# Patient Record
Sex: FEMALE | Race: WHITE | NOT HISPANIC OR LATINO | Employment: OTHER | URBAN - METROPOLITAN AREA
[De-identification: names, ages, dates, MRNs, and addresses within clinical notes are randomized per-mention and may not be internally consistent; named-entity substitution may affect disease eponyms.]

---

## 2018-06-21 PROBLEM — M65.342 TRIGGER FINGER, LEFT RING FINGER: Status: ACTIVE | Noted: 2018-06-21

## 2018-06-21 PROBLEM — Z89.022: Status: ACTIVE | Noted: 2018-06-21

## 2019-06-28 ENCOUNTER — OFFICE VISIT (OUTPATIENT)
Dept: NEUROSURGERY | Facility: CLINIC | Age: 74
End: 2019-06-28
Payer: MEDICARE

## 2019-06-28 VITALS — HEART RATE: 63 BPM | DIASTOLIC BLOOD PRESSURE: 68 MMHG | SYSTOLIC BLOOD PRESSURE: 121 MMHG

## 2019-06-28 DIAGNOSIS — G89.29 CHRONIC MIDLINE LOW BACK PAIN WITHOUT SCIATICA: Primary | ICD-10-CM

## 2019-06-28 DIAGNOSIS — M54.50 CHRONIC MIDLINE LOW BACK PAIN WITHOUT SCIATICA: Primary | ICD-10-CM

## 2019-06-28 DIAGNOSIS — M25.559 ARTHRALGIA OF HIP, UNSPECIFIED LATERALITY: ICD-10-CM

## 2019-06-28 PROCEDURE — 99204 PR OFFICE/OUTPT VISIT, NEW, LEVL IV, 45-59 MIN: ICD-10-PCS | Mod: S$PBB,,, | Performed by: PHYSICIAN ASSISTANT

## 2019-06-28 PROCEDURE — 99204 OFFICE O/P NEW MOD 45 MIN: CPT | Mod: S$PBB,,, | Performed by: PHYSICIAN ASSISTANT

## 2019-06-28 PROCEDURE — 99999 PR PBB SHADOW E&M-EST. PATIENT-LVL IV: CPT | Mod: PBBFAC,,, | Performed by: PHYSICIAN ASSISTANT

## 2019-06-28 PROCEDURE — 99214 OFFICE O/P EST MOD 30 MIN: CPT | Mod: PBBFAC,PN | Performed by: PHYSICIAN ASSISTANT

## 2019-06-28 PROCEDURE — 99999 PR PBB SHADOW E&M-EST. PATIENT-LVL IV: ICD-10-PCS | Mod: PBBFAC,,, | Performed by: PHYSICIAN ASSISTANT

## 2019-06-28 RX ORDER — CHOLECALCIFEROL (VITAMIN D3) 25 MCG
1000 TABLET ORAL DAILY
COMMUNITY

## 2019-06-28 RX ORDER — MULTIVIT WITH MINERALS/HERBS
1 TABLET ORAL DAILY
COMMUNITY
End: 2021-03-05 | Stop reason: CLARIF

## 2019-06-28 NOTE — PROGRESS NOTES
[facility] Neurosurgery  Clinic Consult     Consult Requested By: Prince Bains  PCP: Aashish Santos MD    Chief Complaint:   Chief Complaint   Patient presents with    Lumbar Spine Pain (L-Spine)     patient reports low back pain that radiates into the bilateral hips; denies numbness and tingling; previous low back surgery done abnout 8 years ago; pain 5/10         Past Medical History:   Diagnosis Date    Anticoagulant long-term use     CAD (coronary artery disease)     Encounter for blood transfusion     Hypertension     Thyroid disease      Past Surgical History:   Procedure Laterality Date    CARDIAC SURGERY  2007    x 4 vessels    CAROTID ENDARTERECTOMY Bilateral     each side done twice    CORONARY ANGIOPLASTY WITH STENT PLACEMENT      CORONARY ARTERY BYPASS GRAFT      HYSTERECTOMY      RELEASE-FINGER-TRIGGER-LEFT RING Left 2018    Performed by Norbert Ruelas MD at [facility] OR    REVISION AMPUTATION OF FINGER Left     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      REVISION OF NAILBED LEFT SMALL FINGER Left 2018    Performed by Norbert Ruelas MD at [facility] OR     Family History   Problem Relation Age of Onset    Hypertension Mother     Arthritis Mother     Cancer Father         esophageal    No Known Problems Sister     Heart disease Brother     Cancer Brother     Heart disease Brother     Cancer Brother      Social History     Tobacco Use    Smoking status: Former Smoker     Last attempt to quit: 2007     Years since quittin.9    Smokeless tobacco: Never Used   Substance Use Topics    Alcohol use: Yes     Comment: socially    Drug use: No      Review of patient's allergies indicates:  No Known Allergies    Current Outpatient Medications:     aspirin (ECOTRIN) 81 MG EC tablet, Take 81 mg by mouth every evening. , Disp: , Rfl:     b complex vitamins tablet, Take 1 tablet by mouth once daily., Disp: , Rfl:     carvedilol (COREG) 6.25 MG tablet, Take 6.25 mg  by mouth 2 (two) times daily with meals., Disp: , Rfl:     clopidogrel (PLAVIX) 75 mg tablet, Take 75 mg by mouth once daily., Disp: , Rfl:     coQ10, ubiquinol, 200 mg Cap, Take 400 mg by mouth once daily. , Disp: , Rfl:     escitalopram oxalate (LEXAPRO) 10 MG tablet, Take 10 mg by mouth once daily., Disp: , Rfl:     glucosamine-chondroitin 500-400 mg tablet, Take 1 tablet by mouth 2 (two) times daily., Disp: , Rfl:     hydroCHLOROthiazide (HYDRODIURIL) 12.5 MG Tab, Take 12.5 mg by mouth once daily., Disp: , Rfl:     levothyroxine (SYNTHROID) 50 MCG tablet, Take 50 mcg by mouth once daily., Disp: , Rfl:     loratadine (CLARITIN) 10 mg tablet, Take 10 mg by mouth once daily., Disp: , Rfl:     meloxicam (MOBIC) 15 MG tablet, TAKE 1 TABLET EVERY DAY, Disp: 90 tablet, Rfl: 0    mirabegron (MYRBETRIQ) 25 mg Tb24 ER tablet, Take 25 mg by mouth once daily., Disp: , Rfl:     multivitamin (ONE DAILY MULTIVITAMIN) per tablet, Take 1 tablet by mouth once daily., Disp: , Rfl:     rosuvastatin (CRESTOR) 40 MG Tab, Take 10 mg by mouth every evening. , Disp: , Rfl:     vitamin D (VITAMIN D3) 1000 units Tab, Take 1,000 Units by mouth once daily., Disp: , Rfl:     biotin 10,000 mcg Cap, Take 1 capsule by mouth every evening., Disp: , Rfl:     HYDROcodone-acetaminophen (NORCO)  mg per tablet, Take 1 tablet by mouth every 4 (four) hours as needed (pain)., Disp: 25 tablet, Rfl: 0    Review of Systems:   Constitutional: no fever, chills or night sweats. No changes in weight   Eyes: no visual changes   ENT: no nasal congestion or sore throat   Respiratory: no cough or shortness of breath   Cardiovascular: no chest pain or palpitations   Gastrointestinal: no nausea or vomiting   Genitourinary: no hematuria or dysuria   Integument/Breast: no rash or pruritis   Hematologic/Lymphatic: no easy bruising or lymphadenopathy   Musculoskeletal: +back pain, arthralgias   Neurological: no seizures or tremors   Behavioral/Psych:  no auditory or visual hallucinations   Endocrine: no heat or cold intolerance         OBJECTIVE:     Vital Signs (Most Recent):  Pulse: 63 (06/28/19 1046)  BP: 121/68 (06/28/19 1046)    Physical Exam:   General: well developed, well nourished, no distress.   Neurologic: Alert and oriented. Thought content appropriate. GCS 15.   Head: normocephalic, atraumatic  Eyes: EOMI.  Neck: trachea midline, no JVD   Cardiovascular: no LE edema  Pulmonary: normal respirations, no signs of respiratory distress  Abdomen: non-distended  Sensory: intact to light touch throughout  Skin: Skin is warm, dry and intact.    Motor Strength: Moves all extremities spontaneously with good tone. No abnormal movements seen.       Iliopsoas Quadriceps Knee  Flexion Tibialis  anterior Gastro- cnemius EHL   Lower: R 5/5 5/5 5/5 5/5 5/5 5/5    L 5/5 5/5 5/5 5/5 5/5 5/5     DTR's: 2+ in LE  Clonus: absent    Gait: normal             Able to walk on heels & toes    Lumbar Spine: full ROM, no TTP  Pain on Hip ROM: Negative  Straight leg raise: negative bilaterally       Provider Dictation:     Kenzie Everett is a 73 y.o. female who presents for evaluation of chronic low back and hip pain. Patient reports intermittent low back and hip pain for several years. She states she has been evaluated by ortho and received hip injections in the past with significant pain relief. She denies leg pain, numbness, tingling, weakness. Denies bowel/bladder dysfunction. The hip pain is present with ambulation. She states the pain is mild today. She has not attended physical therapy. She has no imaging of her low back.     On exam, patient has fluid gait, full strength in lower extremities.     PHQ 0  Oswestry Disability Index 30    Given the above, patient has chronic, intermittent low back and hip pain. Hip pain responded well to injections in the past. Patient has not completed any conservative management for her chronic, intermittent low back pain. I  recommend physical therapy for low back and hip. She will reach out to ortho to try another hip injection. I will follow up by phone in 6 weeks. Will order lumbar MRI if fails to improve with PT.     Patient verbalized understanding of plan. Encouraged to call with any questions or concerns.     Chronic midline low back pain without sciatica  -     Ambulatory Referral to Physical/Occupational Therapy    Arthralgia of hip, unspecified laterality  -     Ambulatory Referral to Physical/Occupational Therapy  -     Ambulatory Referral to Physical/Occupational Therapy

## 2019-10-03 ENCOUNTER — TELEPHONE (OUTPATIENT)
Dept: NEUROSURGERY | Facility: CLINIC | Age: 74
End: 2019-10-03

## 2019-10-03 NOTE — TELEPHONE ENCOUNTER
----- Message from Saundra Almodovar sent at 10/3/2019 10:12 AM CDT -----  Contact: Liliana mcleod/ [facility]  [phone]  Liliana is calling to speak with you concerning the patient plan of care that has to be signed.  Please call back to assist at [phone]

## 2019-11-21 ENCOUNTER — OFFICE VISIT (OUTPATIENT)
Dept: SPINE | Facility: CLINIC | Age: 74
End: 2019-11-21
Payer: MEDICARE

## 2019-11-21 VITALS
BODY MASS INDEX: 29.19 KG/M2 | HEIGHT: 62 IN | DIASTOLIC BLOOD PRESSURE: 67 MMHG | HEART RATE: 62 BPM | SYSTOLIC BLOOD PRESSURE: 139 MMHG | WEIGHT: 158.63 LBS

## 2019-11-21 DIAGNOSIS — M54.42 CHRONIC BILATERAL LOW BACK PAIN WITH LEFT-SIDED SCIATICA: Primary | ICD-10-CM

## 2019-11-21 DIAGNOSIS — Z98.890 HISTORY OF LUMBAR SURGERY: ICD-10-CM

## 2019-11-21 DIAGNOSIS — M25.552 PAIN OF BOTH HIP JOINTS: ICD-10-CM

## 2019-11-21 DIAGNOSIS — M25.551 PAIN OF BOTH HIP JOINTS: ICD-10-CM

## 2019-11-21 DIAGNOSIS — G89.29 CHRONIC BILATERAL LOW BACK PAIN WITH LEFT-SIDED SCIATICA: Primary | ICD-10-CM

## 2019-11-21 PROCEDURE — 1159F PR MEDICATION LIST DOCUMENTED IN MEDICAL RECORD: ICD-10-PCS | Mod: ,,, | Performed by: PHYSICIAN ASSISTANT

## 2019-11-21 PROCEDURE — 99999 PR PBB SHADOW E&M-EST. PATIENT-LVL V: ICD-10-PCS | Mod: PBBFAC,,, | Performed by: PHYSICIAN ASSISTANT

## 2019-11-21 PROCEDURE — 1125F PR PAIN SEVERITY QUANTIFIED, PAIN PRESENT: ICD-10-PCS | Mod: ,,, | Performed by: PHYSICIAN ASSISTANT

## 2019-11-21 PROCEDURE — 1159F MED LIST DOCD IN RCRD: CPT | Mod: ,,, | Performed by: PHYSICIAN ASSISTANT

## 2019-11-21 PROCEDURE — 99999 PR PBB SHADOW E&M-EST. PATIENT-LVL V: CPT | Mod: PBBFAC,,, | Performed by: PHYSICIAN ASSISTANT

## 2019-11-21 PROCEDURE — 99215 OFFICE O/P EST HI 40 MIN: CPT | Mod: PBBFAC,PN | Performed by: PHYSICIAN ASSISTANT

## 2019-11-21 PROCEDURE — 99213 PR OFFICE/OUTPT VISIT, EST, LEVL III, 20-29 MIN: ICD-10-PCS | Mod: S$PBB,,, | Performed by: PHYSICIAN ASSISTANT

## 2019-11-21 PROCEDURE — 99213 OFFICE O/P EST LOW 20 MIN: CPT | Mod: S$PBB,,, | Performed by: PHYSICIAN ASSISTANT

## 2019-11-21 PROCEDURE — 1125F AMNT PAIN NOTED PAIN PRSNT: CPT | Mod: ,,, | Performed by: PHYSICIAN ASSISTANT

## 2019-11-21 RX ORDER — POTASSIUM CHLORIDE 750 MG/1
10 CAPSULE, EXTENDED RELEASE ORAL
COMMUNITY
Start: 2019-10-09 | End: 2021-01-25 | Stop reason: CLARIF

## 2019-11-21 NOTE — PROGRESS NOTES
Back and Spine New Patient    Patient ID: Kenzie Everett is a 74 y.o. female.    Chief Complaint   Patient presents with    Low-back Pain     She has had low back pain for years. In the last month the pain is radiating down both hips and into the left leg. Pain is constant and nothing seems to help pain. Lifting, walking, and standing to long makes pain worse.       Review of Systems   Constitutional: Negative for activity change, appetite change, chills, fever and unexpected weight change.   HENT: Negative for tinnitus, trouble swallowing and voice change.    Respiratory: Negative for apnea, cough, chest tightness and shortness of breath.    Cardiovascular: Negative for chest pain and palpitations.   Gastrointestinal: Negative for constipation, diarrhea, nausea and vomiting.   Genitourinary: Negative for difficulty urinating, dysuria, frequency and urgency.   Musculoskeletal: Positive for arthralgias, back pain and myalgias. Negative for gait problem, neck pain and neck stiffness.   Skin: Negative for wound.   Neurological: Negative for dizziness, tremors, seizures, facial asymmetry, speech difficulty, weakness, light-headedness, numbness and headaches.   Psychiatric/Behavioral: Negative for confusion and decreased concentration.       Past Medical History:   Diagnosis Date    Anticoagulant long-term use     CAD (coronary artery disease)     Encounter for blood transfusion     Hypertension     Thyroid disease      Social History     Socioeconomic History    Marital status:      Spouse name: Not on file    Number of children: Not on file    Years of education: Not on file    Highest education level: Not on file   Occupational History    Not on file   Social Needs    Financial resource strain: Not on file    Food insecurity:     Worry: Not on file     Inability: Not on file    Transportation needs:     Medical: Not on file     Non-medical: Not on file   Tobacco Use    Smoking  status: Former Smoker     Last attempt to quit: 2007     Years since quittin.3    Smokeless tobacco: Never Used   Substance and Sexual Activity    Alcohol use: Yes     Comment: socially    Drug use: No    Sexual activity: Not on file   Lifestyle    Physical activity:     Days per week: Not on file     Minutes per session: Not on file    Stress: Not on file   Relationships    Social connections:     Talks on phone: Not on file     Gets together: Not on file     Attends [employer] service: Not on file     Active member of club or organization: Not on file     Attends meetings of clubs or organizations: Not on file     Relationship status: Not on file   Other Topics Concern    Not on file   Social History Narrative    Not on file     Family History   Problem Relation Age of Onset    Hypertension Mother     Arthritis Mother     Cancer Father         esophageal    No Known Problems Sister     Heart disease Brother     Cancer Brother     Heart disease Brother     Cancer Brother      Review of patient's allergies indicates:  No Known Allergies    Current Outpatient Medications:     aspirin (ECOTRIN) 81 MG EC tablet, Take 81 mg by mouth every evening. , Disp: , Rfl:     b complex vitamins tablet, Take 1 tablet by mouth once daily., Disp: , Rfl:     carvedilol (COREG) 6.25 MG tablet, Take 6.25 mg by mouth 2 (two) times daily with meals., Disp: , Rfl:     clopidogrel (PLAVIX) 75 mg tablet, Take 75 mg by mouth once daily., Disp: , Rfl:     coQ10, ubiquinol, 200 mg Cap, Take 400 mg by mouth once daily. , Disp: , Rfl:     escitalopram oxalate (LEXAPRO) 10 MG tablet, Take 10 mg by mouth once daily., Disp: , Rfl:     glucosamine-chondroitin 500-400 mg tablet, Take 1 tablet by mouth 2 (two) times daily., Disp: , Rfl:     hydroCHLOROthiazide (HYDRODIURIL) 12.5 MG Tab, Take 12.5 mg by mouth once daily., Disp: , Rfl:     levothyroxine (SYNTHROID) 50 MCG tablet, Take 50 mcg by mouth once daily., Disp:  ", Rfl:     loratadine (CLARITIN) 10 mg tablet, Take 10 mg by mouth once daily., Disp: , Rfl:     meloxicam (MOBIC) 15 MG tablet, TAKE 1 TABLET EVERY DAY, Disp: 90 tablet, Rfl: 0    mirabegron (MYRBETRIQ) 25 mg Tb24 ER tablet, Take 25 mg by mouth once daily., Disp: , Rfl:     multivitamin (ONE DAILY MULTIVITAMIN) per tablet, Take 1 tablet by mouth once daily., Disp: , Rfl:     potassium chloride (MICRO-K) 10 MEQ CpSR, , Disp: , Rfl:     rosuvastatin (CRESTOR) 40 MG Tab, Take 10 mg by mouth every evening. , Disp: , Rfl:     vitamin D (VITAMIN D3) 1000 units Tab, Take 1,000 Units by mouth once daily., Disp: , Rfl:     biotin 10,000 mcg Cap, Take 1 capsule by mouth every evening., Disp: , Rfl:     HYDROcodone-acetaminophen (NORCO)  mg per tablet, Take 1 tablet by mouth every 4 (four) hours as needed (pain). (Patient not taking: Reported on 11/21/2019), Disp: 25 tablet, Rfl: 0    Vitals:    11/21/19 0804   BP: 139/67   BP Location: Right arm   Patient Position: Sitting   BP Method: Medium (Automatic)   Pulse: 62   Weight: 72 kg (158 lb 9.9 oz)   Height: 5' 2" (1.575 m)       Physical Exam   Constitutional: She is oriented to person, place, and time. She appears well-developed and well-nourished.   HENT:   Head: Normocephalic and atraumatic.   Eyes: Pupils are equal, round, and reactive to light.   Neck: Normal range of motion. Neck supple.   Cardiovascular: Normal rate.   Pulmonary/Chest: Effort normal.   Musculoskeletal: Normal range of motion. She exhibits no edema.   Neurological: She is alert and oriented to person, place, and time. She has a normal Finger-Nose-Finger Test, a normal Heel to Shin Test, a normal Romberg Test and a normal Tandem Gait Test. Gait normal.   Reflex Scores:       Tricep reflexes are 2+ on the right side and 2+ on the left side.       Bicep reflexes are 2+ on the right side and 2+ on the left side.       Brachioradialis reflexes are 2+ on the right side and 2+ on the left " side.       Patellar reflexes are 2+ on the right side and 2+ on the left side.       Achilles reflexes are 2+ on the right side and 2+ on the left side.  Skin: Skin is warm, dry and intact.   Psychiatric: She has a normal mood and affect. Her speech is normal and behavior is normal. Judgment and thought content normal.   Nursing note and vitals reviewed.      Neurologic Exam     Mental Status   Oriented to person, place, and time.   Oriented to person.   Oriented to place.   Oriented to time.   Follows 3 step commands.   Attention: normal. Concentration: normal.   Speech: speech is normal   Level of consciousness: alert  Knowledge: consistent with education.   Able to name object. Able to read. Able to repeat. Able to write. Normal comprehension.     Cranial Nerves     CN II   Visual acuity: normal  Right visual field deficit: none  Left visual field deficit: none     CN III, IV, VI   Pupils are equal, round, and reactive to light.  Right pupil: Size: 3 mm. Shape: regular. Reactivity: brisk. Consensual response: intact.   Left pupil: Size: 3 mm. Shape: regular. Reactivity: brisk. Consensual response: intact.   CN III: no CN III palsy  CN VI: no CN VI palsy  Nystagmus: none   Diplopia: none  Ophthalmoparesis: none  Conjugate gaze: present    CN V   Right facial sensation deficit: none  Left facial sensation deficit: none    CN VII   Right facial weakness: none  Left facial weakness: none    CN VIII   Hearing: intact    CN IX, X   CN IX normal.   CN X normal.     CN XI   Right sternocleidomastoid strength: normal  Left sternocleidomastoid strength: normal  Right trapezius strength: normal  Left trapezius strength: normal    CN XII   Fasciculations: absent  Tongue deviation: none    Motor Exam   Muscle bulk: normal  Overall muscle tone: normal  Right arm pronator drift: absent  Left arm pronator drift: absent    Strength   Right neck flexion: 5/5  Left neck flexion: 5/5  Right neck extension: 5/5  Left neck extension:  5/5  Right deltoid: 5/5  Left deltoid: 5/5  Right biceps: 5/5  Left biceps: 5/5  Right triceps: 5/5  Left triceps: 5/5  Right wrist flexion: 5/5  Left wrist flexion: 5/5  Right wrist extension: 5/5  Left wrist extension: 5  Right interossei: 5/5  Left interossei: 5/  Right abdominals: 5/5  Left abdominals: /5  Right iliopsoas: 5  Left iliopsoas:   Right quadriceps: 55  Left quadriceps: 5  Right hamstrin/5  Left hamstrin/5  Right glutei:   Left glutei:   Right anterior tibial:   Left anterior tibial:   Right posterior tibial:   Left posterior tibial:   Right peroneal:   Left peroneal:   Right gastroc:   Left gastroc:     Sensory Exam   Right arm light touch: normal  Left arm light touch: normal  Right leg light touch: normal  Left leg light touch: normal  Right arm vibration: normal  Left arm vibration: normal  Right arm pinprick: normal  Left arm pinprick: normal    Gait, Coordination, and Reflexes     Gait  Gait: normal    Coordination   Romberg: negative  Finger to nose coordination: normal  Heel to shin coordination: normal  Tandem walking coordination: normal    Tremor   Resting tremor: absent  Intention tremor: absent  Action tremor: absent    Reflexes   Right brachioradialis: 2+  Left brachioradialis: 2+  Right biceps: 2+  Left biceps: 2+  Right triceps: 2+  Left triceps: 2+  Right patellar: 2+  Left patellar: 2+  Right achilles: 2+  Left achilles: 2+  Right Roberts: absent  Left Roberts: absent  Right ankle clonus: absent  Left ankle clonus: absent      Provider dictation:  74-year-old female with coronary artery disease, hypertension, who is maintained on Plavix and has had prior lumbar spine surgery is self-referred for evaluation of back and left leg pain.  She has undergone lumbar spine surgery 7-8 years ago with Dr. Gordon (no records) with questionable benefit.  She was seen in 2019 by neuro surgery who recommended physical therapy for back pain and  hip injections by Orthopedics.  She has subsequently seen orthopedics in October 2019 for chronic bilateral trochanteric bursitis.  Although she had relief with hip injections in the past, a hip injection in October did not provide any benefit.  She continues to have pain across the lower back which has been present for years and pain into the bilateral hips which is also been present for years.  She has 4-6 week new onset pain into the left groin, left lateral shin to the foot.  She denies numbness and tingling.  She is taking meloxicam for pain control and takes gabapentin intermittently.  She underwent physical therapy in June 2019 without benefit.  She has not had ANAMARIA.  Oswestry score: 36%.  PHQ:  2.    On exam she is neurologically intact with 5/5 strength in the upper and lower extremities, 2+ muscle stretch reflexes, and no sensory deficits.  There is no tenderness to percussion over the spine.  She has full range of motion in the upper and lower extremities.    She has not had any recent lumbar imaging.    In light of continued lower back, hip, and new onset left leg pain without resolution from physical therapy in hip injections, I recommend further assessment with additional lumbar imaging.  We will obtain x-rays an MRI of the lumbar spine to determine if there is any neural compression or structural abnormalities contributing to back and left leg pain.  She should follow up in clinic with us after the imaging is complete to discuss results and further recommendations.    Visit Diagnosis:  Chronic bilateral low back pain with left-sided sciatica  -     X-Ray Lumbar Complete With Flex And Ext; Future; Expected date: 11/21/2019  -     MRI Lumbar Spine Without Contrast; Future; Expected date: 11/21/2019    Pain of both hip joints  -     X-Ray Lumbar Complete With Flex And Ext; Future; Expected date: 11/21/2019  -     MRI Lumbar Spine Without Contrast; Future; Expected date: 11/21/2019    History of lumbar  surgery  -     X-Ray Lumbar Complete With Flex And Ext; Future; Expected date: 11/21/2019  -     MRI Lumbar Spine Without Contrast; Future; Expected date: 11/21/2019        Total time spent counseling greater than fifty percent of total visit time.  Counseling included discussion regarding imaging findings, diagnosis possibilities, treatment options, risks and benefits.   The patient had many questions regarding the options and long-term effects.

## 2019-12-11 ENCOUNTER — HOSPITAL ENCOUNTER (OUTPATIENT)
Dept: RADIOLOGY | Facility: HOSPITAL | Age: 74
Discharge: HOME | End: 2019-12-11
Attending: PHYSICIAN ASSISTANT
Payer: MEDICARE

## 2019-12-11 DIAGNOSIS — M25.551 PAIN OF BOTH HIP JOINTS: ICD-10-CM

## 2019-12-11 DIAGNOSIS — M54.42 CHRONIC BILATERAL LOW BACK PAIN WITH LEFT-SIDED SCIATICA: ICD-10-CM

## 2019-12-11 DIAGNOSIS — Z98.890 HISTORY OF LUMBAR SURGERY: ICD-10-CM

## 2019-12-11 DIAGNOSIS — G89.29 CHRONIC BILATERAL LOW BACK PAIN WITH LEFT-SIDED SCIATICA: ICD-10-CM

## 2019-12-11 DIAGNOSIS — M25.552 PAIN OF BOTH HIP JOINTS: ICD-10-CM

## 2019-12-11 PROCEDURE — 72148 MRI LUMBAR SPINE WITHOUT CONTRAST: ICD-10-PCS | Mod: 26,,, | Performed by: RADIOLOGY

## 2019-12-11 PROCEDURE — 72110 XR LUMBAR SPINE 5 VIEW WITH FLEX AND EXT: ICD-10-PCS | Mod: 26,,, | Performed by: RADIOLOGY

## 2019-12-11 PROCEDURE — 72148 MRI LUMBAR SPINE W/O DYE: CPT | Mod: 26,,, | Performed by: RADIOLOGY

## 2019-12-11 PROCEDURE — 72148 MRI LUMBAR SPINE W/O DYE: CPT | Mod: TC,PO

## 2019-12-11 PROCEDURE — 72110 X-RAY EXAM L-2 SPINE 4/>VWS: CPT | Mod: 26,,, | Performed by: RADIOLOGY

## 2019-12-11 PROCEDURE — 72110 X-RAY EXAM L-2 SPINE 4/>VWS: CPT | Mod: TC,FY,PO

## 2019-12-12 ENCOUNTER — TELEPHONE (OUTPATIENT)
Dept: NEUROSURGERY | Facility: CLINIC | Age: 74
End: 2019-12-12

## 2019-12-12 ENCOUNTER — OFFICE VISIT (OUTPATIENT)
Dept: SPINE | Facility: CLINIC | Age: 74
End: 2019-12-12
Payer: MEDICARE

## 2019-12-12 VITALS
SYSTOLIC BLOOD PRESSURE: 121 MMHG | BODY MASS INDEX: 29.21 KG/M2 | HEIGHT: 62 IN | HEART RATE: 58 BPM | DIASTOLIC BLOOD PRESSURE: 63 MMHG | WEIGHT: 158.75 LBS

## 2019-12-12 DIAGNOSIS — M25.551 PAIN OF BOTH HIP JOINTS: ICD-10-CM

## 2019-12-12 DIAGNOSIS — M51.26 HERNIATED LUMBAR INTERVERTEBRAL DISC: Primary | ICD-10-CM

## 2019-12-12 DIAGNOSIS — G89.29 CHRONIC BILATERAL LOW BACK PAIN WITH LEFT-SIDED SCIATICA: ICD-10-CM

## 2019-12-12 DIAGNOSIS — M25.552 PAIN OF BOTH HIP JOINTS: ICD-10-CM

## 2019-12-12 DIAGNOSIS — M54.42 CHRONIC BILATERAL LOW BACK PAIN WITH LEFT-SIDED SCIATICA: ICD-10-CM

## 2019-12-12 DIAGNOSIS — Z98.890 HISTORY OF LUMBAR SURGERY: ICD-10-CM

## 2019-12-12 PROCEDURE — 99999 PR PBB SHADOW E&M-EST. PATIENT-LVL IV: CPT | Mod: PBBFAC,,, | Performed by: PHYSICIAN ASSISTANT

## 2019-12-12 PROCEDURE — 1159F MED LIST DOCD IN RCRD: CPT | Mod: ,,, | Performed by: PHYSICIAN ASSISTANT

## 2019-12-12 PROCEDURE — 1125F AMNT PAIN NOTED PAIN PRSNT: CPT | Mod: ,,, | Performed by: PHYSICIAN ASSISTANT

## 2019-12-12 PROCEDURE — 99214 PR OFFICE/OUTPT VISIT, EST, LEVL IV, 30-39 MIN: ICD-10-PCS | Mod: S$PBB,,, | Performed by: PHYSICIAN ASSISTANT

## 2019-12-12 PROCEDURE — 99214 OFFICE O/P EST MOD 30 MIN: CPT | Mod: PBBFAC,PN | Performed by: PHYSICIAN ASSISTANT

## 2019-12-12 PROCEDURE — 99214 OFFICE O/P EST MOD 30 MIN: CPT | Mod: S$PBB,,, | Performed by: PHYSICIAN ASSISTANT

## 2019-12-12 PROCEDURE — 1159F PR MEDICATION LIST DOCUMENTED IN MEDICAL RECORD: ICD-10-PCS | Mod: ,,, | Performed by: PHYSICIAN ASSISTANT

## 2019-12-12 PROCEDURE — 1125F PR PAIN SEVERITY QUANTIFIED, PAIN PRESENT: ICD-10-PCS | Mod: ,,, | Performed by: PHYSICIAN ASSISTANT

## 2019-12-12 PROCEDURE — 99999 PR PBB SHADOW E&M-EST. PATIENT-LVL IV: ICD-10-PCS | Mod: PBBFAC,,, | Performed by: PHYSICIAN ASSISTANT

## 2019-12-13 NOTE — PROGRESS NOTES
Back and Spine New Patient    Patient ID: Kenzie Everett is a 74 y.o. female.    Chief Complaint   Patient presents with    Follow-up     MRI and X-ray results-low back pain       Review of Systems   Constitutional: Negative for activity change, appetite change, chills, fever and unexpected weight change.   HENT: Negative for tinnitus, trouble swallowing and voice change.    Respiratory: Negative for apnea, cough, chest tightness and shortness of breath.    Cardiovascular: Negative for chest pain and palpitations.   Gastrointestinal: Negative for constipation, diarrhea, nausea and vomiting.   Genitourinary: Negative for difficulty urinating, dysuria, frequency and urgency.   Musculoskeletal: Positive for arthralgias, back pain and myalgias. Negative for gait problem, neck pain and neck stiffness.   Skin: Negative for wound.   Neurological: Negative for dizziness, tremors, seizures, facial asymmetry, speech difficulty, weakness, light-headedness, numbness and headaches.   Psychiatric/Behavioral: Negative for confusion and decreased concentration.       Past Medical History:   Diagnosis Date    Anticoagulant long-term use     CAD (coronary artery disease)     Encounter for blood transfusion     Hypertension     Thyroid disease      Social History     Socioeconomic History    Marital status:      Spouse name: Not on file    Number of children: Not on file    Years of education: Not on file    Highest education level: Not on file   Occupational History    Not on file   Social Needs    Financial resource strain: Not on file    Food insecurity:     Worry: Not on file     Inability: Not on file    Transportation needs:     Medical: Not on file     Non-medical: Not on file   Tobacco Use    Smoking status: Former Smoker     Last attempt to quit: 2007     Years since quittin.4    Smokeless tobacco: Never Used   Substance and Sexual Activity    Alcohol use: Yes     Comment:  socially    Drug use: No    Sexual activity: Not on file   Lifestyle    Physical activity:     Days per week: Not on file     Minutes per session: Not on file    Stress: Not on file   Relationships    Social connections:     Talks on phone: Not on file     Gets together: Not on file     Attends Rastafari service: Not on file     Active member of club or organization: Not on file     Attends meetings of clubs or organizations: Not on file     Relationship status: Not on file   Other Topics Concern    Not on file   Social History Narrative    Not on file     Family History   Problem Relation Age of Onset    Hypertension Mother     Arthritis Mother     Cancer Father         esophageal    No Known Problems Sister     Heart disease Brother     Cancer Brother     Heart disease Brother     Cancer Brother      Review of patient's allergies indicates:  No Known Allergies    Current Outpatient Medications:     aspirin (ECOTRIN) 81 MG EC tablet, Take 81 mg by mouth every evening. , Disp: , Rfl:     b complex vitamins tablet, Take 1 tablet by mouth once daily., Disp: , Rfl:     biotin 10,000 mcg Cap, Take 1 capsule by mouth every evening., Disp: , Rfl:     carvedilol (COREG) 6.25 MG tablet, Take 6.25 mg by mouth 2 (two) times daily with meals., Disp: , Rfl:     clopidogrel (PLAVIX) 75 mg tablet, Take 75 mg by mouth once daily., Disp: , Rfl:     coQ10, ubiquinol, 200 mg Cap, Take 400 mg by mouth once daily. , Disp: , Rfl:     cyanocobalamin, vitamin B-12, (VITAMIN B-12 INJ), Inject as directed every 30 days., Disp: , Rfl:     escitalopram oxalate (LEXAPRO) 10 MG tablet, Take 10 mg by mouth once daily., Disp: , Rfl:     glucosamine-chondroitin 500-400 mg tablet, Take 1 tablet by mouth 2 (two) times daily., Disp: , Rfl:     hydroCHLOROthiazide (HYDRODIURIL) 12.5 MG Tab, Take 12.5 mg by mouth as needed. , Disp: , Rfl:     HYDROcodone-acetaminophen (NORCO)  mg per tablet, Take 1 tablet by mouth every  "4 (four) hours as needed (pain)., Disp: 25 tablet, Rfl: 0    levothyroxine (SYNTHROID) 50 MCG tablet, Take 50 mcg by mouth once daily., Disp: , Rfl:     loratadine (CLARITIN) 10 mg tablet, Take 10 mg by mouth once daily., Disp: , Rfl:     meloxicam (MOBIC) 15 MG tablet, TAKE 1 TABLET EVERY DAY, Disp: 90 tablet, Rfl: 0    mirabegron (MYRBETRIQ) 25 mg Tb24 ER tablet, Take 25 mg by mouth once daily., Disp: , Rfl:     multivitamin (ONE DAILY MULTIVITAMIN) per tablet, Take 1 tablet by mouth once daily., Disp: , Rfl:     potassium chloride (MICRO-K) 10 MEQ CpSR, Take 10 mEq by mouth as needed. , Disp: , Rfl:     rosuvastatin (CRESTOR) 40 MG Tab, Take 10 mg by mouth every evening. , Disp: , Rfl:     vitamin D (VITAMIN D3) 1000 units Tab, Take 1,000 Units by mouth once daily., Disp: , Rfl:     Vitals:    12/12/19 1254   BP: 121/63   BP Location: Left arm   Patient Position: Sitting   BP Method: Medium (Automatic)   Pulse: (!) 58   Weight: 72 kg (158 lb 11.7 oz)   Height: 5' 2" (1.575 m)       Physical Exam   Constitutional: She is oriented to person, place, and time. She appears well-developed and well-nourished.   HENT:   Head: Normocephalic and atraumatic.   Eyes: Pupils are equal, round, and reactive to light.   Neck: Normal range of motion. Neck supple.   Cardiovascular: Normal rate.   Pulmonary/Chest: Effort normal.   Musculoskeletal: Normal range of motion. She exhibits no edema.   Neurological: She is alert and oriented to person, place, and time. She has a normal Finger-Nose-Finger Test, a normal Heel to Shin Test, a normal Romberg Test and a normal Tandem Gait Test. Gait normal.   Reflex Scores:       Tricep reflexes are 2+ on the right side and 2+ on the left side.       Bicep reflexes are 2+ on the right side and 2+ on the left side.       Brachioradialis reflexes are 2+ on the right side and 2+ on the left side.       Patellar reflexes are 2+ on the right side and 2+ on the left side.       Achilles " reflexes are 2+ on the right side and 2+ on the left side.  Skin: Skin is warm, dry and intact.   Psychiatric: She has a normal mood and affect. Her speech is normal and behavior is normal. Judgment and thought content normal.   Nursing note and vitals reviewed.      Neurologic Exam     Mental Status   Oriented to person, place, and time.   Oriented to person.   Oriented to place.   Oriented to time.   Follows 3 step commands.   Attention: normal. Concentration: normal.   Speech: speech is normal   Level of consciousness: alert  Knowledge: consistent with education.   Able to name object. Able to read. Able to repeat. Able to write. Normal comprehension.     Cranial Nerves     CN II   Visual acuity: normal  Right visual field deficit: none  Left visual field deficit: none     CN III, IV, VI   Pupils are equal, round, and reactive to light.  Right pupil: Size: 3 mm. Shape: regular. Reactivity: brisk. Consensual response: intact.   Left pupil: Size: 3 mm. Shape: regular. Reactivity: brisk. Consensual response: intact.   CN III: no CN III palsy  CN VI: no CN VI palsy  Nystagmus: none   Diplopia: none  Ophthalmoparesis: none  Conjugate gaze: present    CN V   Right facial sensation deficit: none  Left facial sensation deficit: none    CN VII   Right facial weakness: none  Left facial weakness: none    CN VIII   Hearing: intact    CN IX, X   CN IX normal.   CN X normal.     CN XI   Right sternocleidomastoid strength: normal  Left sternocleidomastoid strength: normal  Right trapezius strength: normal  Left trapezius strength: normal    CN XII   Fasciculations: absent  Tongue deviation: none    Motor Exam   Muscle bulk: normal  Overall muscle tone: normal  Right arm pronator drift: absent  Left arm pronator drift: absent    Strength   Right neck flexion: 5/5  Left neck flexion: 5/5  Right neck extension: 5/5  Left neck extension: 5/5  Right deltoid: 5/5  Left deltoid: 5/5  Right biceps: 5/5  Left biceps: 5/5  Right  triceps: 5/5  Left triceps: 5/5  Right wrist flexion: 5/5  Left wrist flexion: 5/5  Right wrist extension: 5/5  Left wrist extension: 5/5  Right interossei: 5/5  Left interossei: 5/5  Right abdominals: 5/5  Left abdominals: 5/5  Right iliopsoas: 5/  Left iliopsoas: 5/  Right quadriceps: 5  Left quadriceps:   Right hamstrin/5  Left hamstrin/5  Right glutei:   Left glutei:   Right anterior tibial: 5  Left anterior tibial:   Right posterior tibial:   Left posterior tibial:   Right peroneal:   Left peroneal:   Right gastroc:   Left gastroc:     Sensory Exam   Right arm light touch: normal  Left arm light touch: normal  Right leg light touch: normal  Left leg light touch: normal  Right arm vibration: normal  Left arm vibration: normal  Right arm pinprick: normal  Left arm pinprick: normal    Gait, Coordination, and Reflexes     Gait  Gait: normal    Coordination   Romberg: negative  Finger to nose coordination: normal  Heel to shin coordination: normal  Tandem walking coordination: normal    Tremor   Resting tremor: absent  Intention tremor: absent  Action tremor: absent    Reflexes   Right brachioradialis: 2+  Left brachioradialis: 2+  Right biceps: 2+  Left biceps: 2+  Right triceps: 2+  Left triceps: 2+  Right patellar: 2+  Left patellar: 2+  Right achilles: 2+  Left achilles: 2+  Right Roberts: absent  Left Roberts: absent  Right ankle clonus: absent  Left ankle clonus: absent      Provider dictation:  74-year-old female with coronary artery disease, hypertension, who is maintained on Plavix and has had prior lumbar spine surgery presents for follow up evaluation of back and left leg pain after undergoing MRI/ xrays of the lumbar spine.  She has not had any improvement since last visit.  Recall, she has undergone lumbar spine surgery 7-8 years ago with Dr. Gordon (no records) with questionable benefit.  She was seen in 2019 by neuro surgery who recommended physical  therapy for back pain and hip injections by Orthopedics.  She has subsequently seen orthopedics in October 2019 for chronic bilateral trochanteric bursitis.  Although she had relief with hip injections in the past, a hip injection in October did not provide any benefit.  She continues to have pain across the lower back which has been present for years and pain into the bilateral hips which is also been present for years.  There is new 6 weeks onset pain into the left groin, left lateral shin to the foot.  She denies numbness and tingling.  She is taking meloxicam for pain control and takes gabapentin intermittently.  She underwent physical therapy in June 2019 without benefit.  She has not had ANAMARIA.  She denies bowel bladder dysfunction.  She denies any weakness.  Oswestry score: 36%.  PHQ:  2.    On exam she is neurologically intact with 5/5 strength in the upper and lower extremities, 2+ muscle stretch reflexes, and no sensory deficits.  There is no tenderness to percussion over the spine.  She has full range of motion in the upper and lower extremities.    X-ray and MRI of the lumbar spine obtained 12/11/2019 personally reviewed and reveals good alignment of the bones is no evidence of instability on flexion or extension.  There multilevel degenerative changes.  The most significant findings on L3-4 large left lateral recess and foraminal disc hernia resulting in lateral recess and foraminal narrowing.  At L4-5 there is an even larger left lateral recess disc hernia with left lateral recess and foraminal narrowing.  There is central canal stenosis at this area.  Postoperative changes of a left sided laminectomy at L4-5 are noted.    Ms. Ordonez has chronic lower back and hip pain with new onset left leg radiculopathy in an L4, L5 distribution associated with large left lateral recess disc hernias at L3-4 and L4-5.  She had been undergoing physical therapy, but we will hold on it for now as she is having some  scheduling complex due to other medical testing indications.  With degree of pain that she is experiencing am referring her to pain management to consider injections at L3-4 and L4-5.  I am also referring her to neuro surgery due to the size of the disc hernias.  There is central canal narrowing at L4-5 due to the large disc hernia.  At the present time she has no signs of myelopathy or cauda equina, but she does understand that if she was to developed bowel bladder incontinence or any weakness in the lower extremity she should go to the emergency room immediately.  Follow up with me as needed.    Visit Diagnosis:  Herniated lumbar intervertebral disc    Chronic bilateral low back pain with left-sided sciatica    History of lumbar surgery    Pain of both hip joints        Total time spent counseling greater than fifty percent of total visit time.  Counseling included discussion regarding imaging findings, diagnosis possibilities, treatment options, risks and benefits.   The patient had many questions regarding the options and long-term effects.

## 2019-12-17 ENCOUNTER — OFFICE VISIT (OUTPATIENT)
Dept: PAIN MEDICINE | Facility: CLINIC | Age: 74
End: 2019-12-17
Payer: MEDICARE

## 2019-12-17 ENCOUNTER — OFFICE VISIT (OUTPATIENT)
Dept: NEUROSURGERY | Facility: CLINIC | Age: 74
End: 2019-12-17
Payer: MEDICARE

## 2019-12-17 VITALS
HEIGHT: 61 IN | DIASTOLIC BLOOD PRESSURE: 63 MMHG | HEART RATE: 68 BPM | WEIGHT: 160.69 LBS | SYSTOLIC BLOOD PRESSURE: 124 MMHG | BODY MASS INDEX: 30.34 KG/M2

## 2019-12-17 VITALS
RESPIRATION RATE: 20 BRPM | WEIGHT: 162.06 LBS | HEART RATE: 60 BPM | OXYGEN SATURATION: 98 % | SYSTOLIC BLOOD PRESSURE: 169 MMHG | TEMPERATURE: 98 F | DIASTOLIC BLOOD PRESSURE: 68 MMHG | BODY MASS INDEX: 29.82 KG/M2 | HEIGHT: 62 IN

## 2019-12-17 DIAGNOSIS — M54.16 LUMBAR RADICULOPATHY: Primary | ICD-10-CM

## 2019-12-17 DIAGNOSIS — M51.26 LUMBAR HERNIATED DISC: Primary | ICD-10-CM

## 2019-12-17 PROCEDURE — 99204 OFFICE O/P NEW MOD 45 MIN: CPT | Mod: S$PBB,,, | Performed by: ANESTHESIOLOGY

## 2019-12-17 PROCEDURE — 99214 OFFICE O/P EST MOD 30 MIN: CPT | Mod: PBBFAC,27,PN | Performed by: NEUROLOGICAL SURGERY

## 2019-12-17 PROCEDURE — 1125F AMNT PAIN NOTED PAIN PRSNT: CPT | Mod: ,,, | Performed by: ANESTHESIOLOGY

## 2019-12-17 PROCEDURE — 99999 PR PBB SHADOW E&M-EST. PATIENT-LVL V: ICD-10-PCS | Mod: PBBFAC,,, | Performed by: ANESTHESIOLOGY

## 2019-12-17 PROCEDURE — 1159F PR MEDICATION LIST DOCUMENTED IN MEDICAL RECORD: ICD-10-PCS | Mod: ,,, | Performed by: ANESTHESIOLOGY

## 2019-12-17 PROCEDURE — 1125F PR PAIN SEVERITY QUANTIFIED, PAIN PRESENT: ICD-10-PCS | Mod: ,,, | Performed by: ANESTHESIOLOGY

## 2019-12-17 PROCEDURE — 99999 PR PBB SHADOW E&M-EST. PATIENT-LVL V: CPT | Mod: PBBFAC,,, | Performed by: ANESTHESIOLOGY

## 2019-12-17 PROCEDURE — 1159F MED LIST DOCD IN RCRD: CPT | Mod: ,,, | Performed by: NEUROLOGICAL SURGERY

## 2019-12-17 PROCEDURE — 1125F PR PAIN SEVERITY QUANTIFIED, PAIN PRESENT: ICD-10-PCS | Mod: ,,, | Performed by: NEUROLOGICAL SURGERY

## 2019-12-17 PROCEDURE — 99999 PR PBB SHADOW E&M-EST. PATIENT-LVL IV: CPT | Mod: PBBFAC,,, | Performed by: NEUROLOGICAL SURGERY

## 2019-12-17 PROCEDURE — 1159F PR MEDICATION LIST DOCUMENTED IN MEDICAL RECORD: ICD-10-PCS | Mod: ,,, | Performed by: NEUROLOGICAL SURGERY

## 2019-12-17 PROCEDURE — 99999 PR PBB SHADOW E&M-EST. PATIENT-LVL IV: ICD-10-PCS | Mod: PBBFAC,,, | Performed by: NEUROLOGICAL SURGERY

## 2019-12-17 PROCEDURE — 1159F MED LIST DOCD IN RCRD: CPT | Mod: ,,, | Performed by: ANESTHESIOLOGY

## 2019-12-17 PROCEDURE — 99204 PR OFFICE/OUTPT VISIT, NEW, LEVL IV, 45-59 MIN: ICD-10-PCS | Mod: S$PBB,,, | Performed by: ANESTHESIOLOGY

## 2019-12-17 PROCEDURE — 99215 OFFICE O/P EST HI 40 MIN: CPT | Mod: S$PBB,,, | Performed by: NEUROLOGICAL SURGERY

## 2019-12-17 PROCEDURE — 99215 PR OFFICE/OUTPT VISIT, EST, LEVL V, 40-54 MIN: ICD-10-PCS | Mod: S$PBB,,, | Performed by: NEUROLOGICAL SURGERY

## 2019-12-17 PROCEDURE — 99215 OFFICE O/P EST HI 40 MIN: CPT | Mod: PBBFAC,PN | Performed by: ANESTHESIOLOGY

## 2019-12-17 PROCEDURE — 1125F AMNT PAIN NOTED PAIN PRSNT: CPT | Mod: ,,, | Performed by: NEUROLOGICAL SURGERY

## 2019-12-17 RX ORDER — TRAMADOL HYDROCHLORIDE 50 MG/1
50 TABLET ORAL 3 TIMES DAILY PRN
Qty: 30 TABLET | Refills: 1 | Status: SHIPPED | OUTPATIENT
Start: 2019-12-17 | End: 2019-12-27

## 2019-12-17 RX ORDER — ALPRAZOLAM 0.5 MG/1
1 TABLET, ORALLY DISINTEGRATING ORAL ONCE AS NEEDED
Status: CANCELLED | OUTPATIENT
Start: 2019-12-27 | End: 2031-05-24

## 2019-12-17 RX ORDER — GABAPENTIN 100 MG/1
200 CAPSULE ORAL 2 TIMES DAILY
Qty: 120 CAPSULE | Refills: 2 | Status: SHIPPED | OUTPATIENT
Start: 2019-12-17 | End: 2020-02-27

## 2019-12-17 NOTE — LETTER
December 29, 2019      Vijaya Cuevas PA-C  [address] [facility] [address]  [address]  [facility] [address]           [place] - Neurosurgery  [address] [place] [address]  Phone: [phone]  Fax: [phone]          Patient: Kenzie Everett   MR Number: [id number]   YOB: 1945   Date of Visit: 12/17/2019       Dear Vijaya Cuevas:    Thank you for referring Kenzie Everett to me for evaluation. Attached you will find relevant portions of my assessment and plan of care.    If you have questions, please do not hesitate to call me. I look forward to following Kenzie Everett along with you.    Sincerely,    Ajith Rinaldi MD    Enclosure  CC:  No Recipients    If you would like to receive this communication electronically, please contact [email or web address] or [phone] to request more information on [employer] Link access.    For providers and/or their staff who would like to refer a patient to [facility], please contact us through our one-stop-shop provider referral line, [facility], at [phone].    If you feel you have received this communication in error or would no longer like to receive these types of communications, please e-mail [email or web address]

## 2019-12-17 NOTE — LETTER
December 17, 2019      Vijaya Cuevas PA-C  [address] [facility] [address]  [address]  [facility] [address]           [place] - Pain Management  [address] [place] [address]  Phone: [phone]  Fax: [phone]          Patient: Kenzie Everett   MR Number: [id number]   YOB: 1945   Date of Visit: 12/17/2019       Dear Vijaya Cuevas:    Thank you for referring Kenzie Everett to me for evaluation. Attached you will find relevant portions of my assessment and plan of care.    If you have questions, please do not hesitate to call me. I look forward to following Kenzie Everett along with you.    Sincerely,    Delmer Genao MD    Enclosure  CC:  No Recipients    If you would like to receive this communication electronically, please contact [email or web address] or [phone] to request more information on [employer] Link access.    For providers and/or their staff who would like to refer a patient to [facility], please contact us through our one-stop-shop provider referral line, [facility], at [phone].    If you feel you have received this communication in error or would no longer like to receive these types of communications, please e-mail [email or web address]

## 2019-12-17 NOTE — H&P (VIEW-ONLY)
This note was completed with dictation software and grammatical errors may exist.    CC:  Back pain, left leg pain    HPI:  The patient is a 74-year-old woman with a history of hypertension, CAD on Plavix, history of lumbar surgery who presents in referral from Vijaya Cuevas, Neurosurgery for back pain and left leg pain. The patient reports having back pain for many years.  She does have a history of prior lumbar surgery about 8 years ago with Dr. Godron with some relief of her back pain, unclear if she ever had any leg pain, nonetheless she was doing okay until recently.  However, in the last 3 months she began developing worsening back pain and pain radiating down her left buttock, posterior thigh and her calf.  She describes the pain is also radiating into the left groin at times.  It is worse with standing and walking, somewhat relieved with sitting down.  She has some bilateral buttock pain and has a history of trochanteric bursa injections with orthopedics which used to provide some relief but the last time it did not provide any benefit.  She denies any liya weakness, denies any bowel or bladder incontinence.  She was recently started on gabapentin 100 milligrams twice daily but reports that she has not had any relief with this.    Pain intervention history:  She has done some physical therapy in [place] where she lives without much relief.  She has been taking gabapentin 100 milligrams twice daily with no major relief.    ROS:  She reports back pain only.  Balance of review of systems is negative.    Past Medical History:   Diagnosis Date    Anticoagulant long-term use     CAD (coronary artery disease)     Encounter for blood transfusion     Hypertension     Thyroid disease        Past Surgical History:   Procedure Laterality Date    CARDIAC SURGERY  2007    x 4 vessels    CAROTID ENDARTERECTOMY Bilateral     each side done twice    CORONARY ANGIOPLASTY WITH STENT PLACEMENT      CORONARY ARTERY  "BYPASS GRAFT      HYSTERECTOMY      REVISION AMPUTATION OF FINGER Left     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      TRIGGER FINGER RELEASE Left 2018    Procedure: RELEASE-FINGER-TRIGGER-LEFT RING;  Surgeon: Norbert Ruelas MD;  Location: [facility];  Service: Orthopedics;  Laterality: Left;       Social History     Socioeconomic History    Marital status:      Spouse name: Not on file    Number of children: Not on file    Years of education: Not on file    Highest education level: Not on file   Occupational History    Not on file   Social Needs    Financial resource strain: Not on file    Food insecurity:     Worry: Not on file     Inability: Not on file    Transportation needs:     Medical: Not on file     Non-medical: Not on file   Tobacco Use    Smoking status: Former Smoker     Last attempt to quit: 2007     Years since quittin.4    Smokeless tobacco: Never Used   Substance and Sexual Activity    Alcohol use: Yes     Comment: socially    Drug use: No    Sexual activity: Not on file   Lifestyle    Physical activity:     Days per week: Not on file     Minutes per session: Not on file    Stress: Not on file   Relationships    Social connections:     Talks on phone: Not on file     Gets together: Not on file     Attends Jain service: Not on file     Active member of club or organization: Not on file     Attends meetings of clubs or organizations: Not on file     Relationship status: Not on file   Other Topics Concern    Not on file   Social History Narrative    Not on file         Medications/Allergies: See med card    Vitals:    19 0950   BP: (!) 169/68   Pulse: 60   Resp: 20   Temp: 97.6 °F (36.4 °C)   TempSrc: Oral   SpO2: 98%   Weight: 73.5 kg (162 lb 0.6 oz)   Height: 5' 2" (1.575 m)   PainSc:   8   PainLoc: Back         Physical exam:  Gen: A and O x3, pleasant, well-groomed  Skin: No rashes or obvious lesions  HEENT: PERRLA, no obvious deformities " on ears or in canals. Trachea midline.  CVS: Regular rate and rhythm, normal palpable pulses.  Resp:No increased work of breathing, symmetrical chest rise.  Abdomen: Soft, NT/ND.  Musculoskeletal:No antalgic gait.     Neuro:  Lower extremities: 5/5 strength bilaterally, except for left hip flexion 4/5  Reflexes: Patellar 0+, Achilles 0+ bilaterally.  Sensory:  Intact and symmetrical to light touch and pinprick in L2-S1 dermatomes bilaterally.    Lumbar spine:  Lumbar spine:  Range of motion is mildly reduced with flexion with no increased pain, moderately reduced with extension with increased pain in the bilateral low back especially with the left oblique extension causing left buttock pain.  Adair's test causes no increased pain on either side.    Supine straight leg raise is negative bilaterally.    Internal and external rotation of the hip causes no increased pain on either side.  Myofascial exam: No tenderness to palpation across lumbar paraspinous muscles.  No tenderness palpation over the greater trochanter bursa on either side.    Imagin19 MRI L-spine:  T12-L1: There is right facet joint arthropathy with ligamentum flavum thickening contributing to mild right foraminal stenosis.  There is no spinal stenosis.  L1-2: There is bilateral, left greater than right, facet joint arthropathy with ligamentum flavum thickening and a minimal disc bulge.  There is mild left foraminal stenosis without spinal stenosis.  L2-3: There is marked disc space narrowing.  There is a disc bulge with osteophytic ridging and superimposed broad left paracentral disc protrusion with annular fissure.  There is facet joint arthropathy.  There is no spinal stenosis.  There is mild bilateral foraminal stenosis.  L3-4: There is moderate disc space narrowing.  There is a disc bulge with osteophytic ridging but there's a superimposed left paracentral and foraminal disc extrusion in addition to moderate facet joint arthropathy.   There is no significant spinal stenosis but there is severe left lateral recess and foraminal stenosis with mild-to-moderate right foraminal stenosis.  L4-5: There is marked disc space narrowing.  There's been left laminectomy.  There's right greater than left facet joint arthropathy.  There is a diffuse disc bulge with osteophytic ridging and superimposed large left paracentral disc extrusion contributing to moderate spinal stenosis, severe left and moderate right lateral recess stenosis as well as moderate to severe left and moderate right foraminal stenosis.  L5-S1: There is right greater than left facet joint arthropathy.  There's a mild disc bulge.  There is no significant spinal stenosis.  There is moderate right and mild left foraminal stenosis.    12/11/19 Xray L-spine:  There is a minimal rotary levocurvature of the lower lumbar spine.  There is multilevel degenerative disc and facet disease.  There is no fracture or malalignment and there is no abnormal excursion with flexion or extension.  There's marked disc space narrowing at the L2-3 through L4-5 levels.  There is multilevel facet joint arthropathy, most severe at the L5-S1 level.  There is moderate atherosclerosis.    Assessment:   The patient is a 74-year-old woman with a history of hypertension, CAD on Plavix, history of lumbar surgery who presents in referral from Vijaya Cuevas, Neurosurgery for back pain and left leg pain.   1. Lumbar radiculopathy  Vital signs    Verify informed consent    Notify physician     Notify physician     Notify physician (specify)    Diet NPO    Case Request Operating Room: Injection,steroid,epidural,transforaminal approach L3/4 and L4/5    Place in Outpatient    alprazolam ODT dissolvable tablet 1 mg         Plan:  1.  We reviewed her symptoms, reviewed her lumbar spine MRI.  She has severe lateral recess narrowing out to the left side at L3/4 in addition to canal stenosis and lateral recess stenosis out to the left  side at L4/5.  This would seem to account for her left leg pain and I explained that she is also showing some signs of weakness in her left hip flexors.  She has had some groin pain with this, no pain on provocative hip joint maneuvers.  She does have an appointment with Dr. Rinaldi, Neurosurgery in several weeks.  We discussed the role of epidural steroid injections in providing some relief and she would like to proceed with this.  I will set her up for a left L3/4 and L4/5 transforaminal injection. I explained that this might help provide some benefit but unclear if this is going to provide lasting relief.  In the meantime I will also have her increase gabapentin up to 200 milligrams twice daily and I have given her prescription for tramadol to use as needed for severe pain. I will have her follow up in several weeks after the injection or sooner as needed.  2.  We will need to get approval to hold Plavix and aspirin for 5-7 days prior to the procedure. Her last stent was over several years ago.    Thank you for referring this interesting patient, and I look forward to continuing to collaborate in her care.

## 2019-12-17 NOTE — PROGRESS NOTES
[facility] in [address] [place] [address] Drive,[address]    [address] [place] [address]    Phone:  [phone]    Fax:  [phone]   MRN: [id number]    Department:  [facility] in [facility] [place]   Date of Visit:  1/4/2017 Neurosurgery History & Physical    Patient ID: Kenzie Everett is a 74 y.o. female.    Chief Complaint   Patient presents with    Lumbar Spine Pain (L-Spine)     Long history of worsening low back pain noting pain to the entire LLE to the foot. Denies numbness or weakness. Pain is increased with lifting. Alleviated slightly with Tylenol. Denies history of injections. She has done PT without improvement. History of lumbar decompression with Dr. Gordon in 2006       HPI:  74 year old female with history of many years of low back pain which got worse in the 2013 timeframe.  No significant leg pain at the time.  She had a back surgery in 2013 by Dr. Gordon but she does not know exactly what kind of surgery was done or at what level.  Following the surgery her back pain seemed to get better but quickly went back to where it was.  In the last three months she has started to develop pain that radiates into her bilateral hips.  She also has pain radiating into the left side of her groin as well as down the lateral aspect of her left leg and on to the top of her foot. She denies bowel/bladder incontinence.  Tylenol tends to dull pain.  Standing for any length of time makes the pain much worse.  It is also hard to find a comfortable way to sit.  She cannot lay flat.  She needs to lay on her side with her legs curled up.      She has had physical therapy approximately 1 month ago which did not help the pain.  She will have epidural steroid injections next week.      Review of Systems   Constitutional: Negative for chills and fever.   HENT: Negative for sinus pressure and sneezing.    Respiratory: Negative for shortness of breath.    Cardiovascular: Negative for chest pain.   Gastrointestinal: Negative for nausea and vomiting.   Musculoskeletal: Positive for back pain.       Past Medical History:   Diagnosis Date    Anticoagulant long-term use     CAD (coronary artery disease)     Encounter for blood transfusion      Hypertension     Thyroid disease      Social History     Socioeconomic History    Marital status:      Spouse name: Not on file    Number of children: Not on file    Years of education: Not on file    Highest education level: Not on file   Occupational History    Not on file   Social Needs    Financial resource strain: Not on file    Food insecurity:     Worry: Not on file     Inability: Not on file    Transportation needs:     Medical: Not on file     Non-medical: Not on file   Tobacco Use    Smoking status: Former Smoker     Last attempt to quit: 2007     Years since quittin.4    Smokeless tobacco: Never Used   Substance and Sexual Activity    Alcohol use: Yes     Comment: socially    Drug use: No    Sexual activity: Not on file   Lifestyle    Physical activity:     Days per week: Not on file     Minutes per session: Not on file    Stress: Not on file   Relationships    Social connections:     Talks on phone: Not on file     Gets together: Not on file     Attends Tenriism service: Not on file     Active member of club or organization: Not on file     Attends meetings of clubs or organizations: Not on file     Relationship status: Not on file   Other Topics Concern    Not on file   Social History Narrative    Not on file     Family History   Problem Relation Age of Onset    Hypertension Mother     Arthritis Mother     Cancer Father         esophageal    No Known Problems Sister     Heart disease Brother     Cancer Brother     Heart disease Brother     Cancer Brother      Review of patient's allergies indicates:  No Known Allergies    Current Outpatient Medications:     aspirin (ECOTRIN) 81 MG EC tablet, Take 81 mg by mouth every evening. , Disp: , Rfl:     b complex vitamins tablet, Take 1 tablet by mouth once daily., Disp: , Rfl:     carvedilol (COREG) 6.25 MG tablet, Take 6.25 mg by mouth 2 (two) times daily with meals., Disp: , Rfl:     clopidogrel (PLAVIX) 75  These medications were sent to [facility] [address] [facility], [address], [phone], [phone]  Diane Chaidez, [place] [place] [id number]     Phone:  [phone]    - Fluticasone Propionate "mg tablet, Take 75 mg by mouth once daily., Disp: , Rfl:     coQ10, ubiquinol, 200 mg Cap, Take 400 mg by mouth once daily. , Disp: , Rfl:     cyanocobalamin, vitamin B-12, (VITAMIN B-12 INJ), Inject as directed every 30 days., Disp: , Rfl:     escitalopram oxalate (LEXAPRO) 10 MG tablet, Take 10 mg by mouth once daily., Disp: , Rfl:     gabapentin (NEURONTIN) 100 MG capsule, Take 2 capsules (200 mg total) by mouth 2 (two) times daily., Disp: 120 capsule, Rfl: 2    glucosamine-chondroitin 500-400 mg tablet, Take 1 tablet by mouth 2 (two) times daily., Disp: , Rfl:     hydroCHLOROthiazide (HYDRODIURIL) 12.5 MG Tab, Take 12.5 mg by mouth as needed. , Disp: , Rfl:     HYDROcodone-acetaminophen (NORCO)  mg per tablet, Take 1 tablet by mouth every 4 (four) hours as needed (pain)., Disp: 25 tablet, Rfl: 0    levothyroxine (SYNTHROID) 50 MCG tablet, Take 50 mcg by mouth once daily., Disp: , Rfl:     loratadine (CLARITIN) 10 mg tablet, Take 10 mg by mouth once daily., Disp: , Rfl:     meloxicam (MOBIC) 15 MG tablet, TAKE 1 TABLET EVERY DAY, Disp: 90 tablet, Rfl: 0    mirabegron (MYRBETRIQ) 25 mg Tb24 ER tablet, Take 25 mg by mouth once daily., Disp: , Rfl:     multivitamin (ONE DAILY MULTIVITAMIN) per tablet, Take 1 tablet by mouth once daily., Disp: , Rfl:     potassium chloride (MICRO-K) 10 MEQ CpSR, Take 10 mEq by mouth as needed. , Disp: , Rfl:     rosuvastatin (CRESTOR) 40 MG Tab, Take 10 mg by mouth every evening. , Disp: , Rfl:     traMADol (ULTRAM) 50 mg tablet, Take 1 tablet (50 mg total) by mouth 3 (three) times daily as needed for Pain., Disp: 30 tablet, Rfl: 1    vitamin D (VITAMIN D3) 1000 units Tab, Take 1,000 Units by mouth once daily., Disp: , Rfl:     biotin 10,000 mcg Cap, Take 1 capsule by mouth every evening., Disp: , Rfl:   Blood pressure 124/63, pulse 68, height 5' 1" (1.549 m), weight 72.9 kg (160 lb 11.5 oz).      Neurologic Exam     Mental Status   Oriented to person, place, " from our patient liason soon after your visit. Also, some patients receive a detailed feedback survey mailed to them a week after the visit. If you receive this, we would really appreciate it if you could take the time to complete it. Thank you!       [facility] ([address]) [facility] [address] [facility]. ([address]) [facility] [address] ([address]) [address]  [address] [facility] [address] and time.   Attention: normal.   Speech: speech is normal   Level of consciousness: alert  Knowledge: good.     Cranial Nerves     CN II   Visual fields full to confrontation.     CN III, IV, VI   Pupils are equal, round, and reactive to light.  Extraocular motions are normal.     CN V   Facial sensation intact.     CN VII   Facial expression full, symmetric.     CN VIII   CN VIII normal.     CN XI   CN XI normal.     CN XII   CN XII normal.     Motor Exam   Muscle bulk: normal  Overall muscle tone: normal    Strength   Strength 5/5 except as noted.   Left iliopsoas: 4/5  Left quadriceps: 4/5  Left anterior tibial: 4/5    Sensory Exam   Light touch normal.     Gait, Coordination, and Reflexes     Gait  Gait: normal    Coordination   Romberg: negative      Physical Exam   Constitutional: She is oriented to person, place, and time.   Eyes: Pupils are equal, round, and reactive to light. EOM are normal.   Neurological: She is oriented to person, place, and time. She has a normal Romberg Test. Gait normal.   Psychiatric: Her speech is normal.       Imaging:  MRI lumbar spine dated 12/11/2019 and lumbar spine xray dated 12/11/2019 is personally reviewed and discussed with the patient.  There is diffuse lumbar spondylosis.  At L4-5 there is a large left sided disc herniation that causes significant lateral recess stenosis.  At L3-4 there is also a disc herniation that is many times smaller than at L4-5 but does cause lateral recess stenosis.      There is no dynamic instabilitiy on flexion/extension films.      Assessment/Plan:   Given the disc herniations at both L3-4 and L4-5 it is difficult to say which is the more symptomatic level.  Both herniations are left sided and her pain/weakness is on the left.  She has both hip flexion weakness as well as foot dorsiflexion weakness so both levels could be involved.  If she were to undergo surgery I would recommend two level microdiscectomy.  She understands that this would be  "more difficult than her original surgery since it would be a "redo" surgery and would involve two levels.    At this time she is going to try conservative therapy with epidural steroid injections.  She will see how she feels following these injections and call us if she wants to further consider surgery.    Total visit time 45 minutes with greater than 50% in face to face counseling.  "

## 2019-12-17 NOTE — PROGRESS NOTES
This note was completed with dictation software and grammatical errors may exist.    CC:  Back pain, left leg pain    HPI:  The patient is a 74-year-old woman with a history of hypertension, CAD on Plavix, history of lumbar surgery who presents in referral from [facility] C[facility], Neurosurgery for back pain and left leg pain. The patient reports having back pain for many years.  She does have a history of prior lumbar surgery about 8 years ago with Dr. Gordon with some relief of her back pain, unclear if she ever had any leg pain, nonetheless she was doing okay until recently.  However, in the last 3 months she began developing worsening back pain and pain radiating down her left buttock, posterior thigh and her calf.  She describes the pain is also radiating into the left groin at times.  It is worse with standing and walking, somewhat relieved with sitting down.  She has some bilateral buttock pain and has a history of trochanteric bursa injections with orthopedics which used to provide some relief but the last time it did not provide any benefit.  She denies any liya weakness, denies any bowel or bladder incontinence.  She was recently started on gabapentin 100 milligrams twice daily but reports that she has not had any relief with this.    Pain intervention history:  She has done some physical therapy in [place] where she lives without much relief.  She has been taking gabapentin 100 milligrams twice daily with no major relief.    ROS:  She reports back pain only.  Balance of review of systems is negative.    Past Medical History:   Diagnosis Date    Anticoagulant long-term use     CAD (coronary artery disease)     Encounter for blood transfusion     Hypertension     Thyroid disease        Past Surgical History:   Procedure Laterality Date    CARDIAC SURGERY  2007    x 4 vessels    CAROTID ENDARTERECTOMY Bilateral     each side done twice    CORONARY ANGIOPLASTY WITH STENT PLACEMENT      CORONARY ARTERY  "BYPASS GRAFT      HYSTERECTOMY      REVISION AMPUTATION OF FINGER Left     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      TRIGGER FINGER RELEASE Left 2018    Procedure: RELEASE-FINGER-TRIGGER-LEFT RING;  Surgeon: Norbert Ruelas MD;  Location: [facility];  Service: Orthopedics;  Laterality: Left;       Social History     Socioeconomic History    Marital status:      Spouse name: Not on file    Number of children: Not on file    Years of education: Not on file    Highest education level: Not on file   Occupational History    Not on file   Social Needs    Financial resource strain: Not on file    Food insecurity:     Worry: Not on file     Inability: Not on file    Transportation needs:     Medical: Not on file     Non-medical: Not on file   Tobacco Use    Smoking status: Former Smoker     Last attempt to quit: 2007     Years since quittin.4    Smokeless tobacco: Never Used   Substance and Sexual Activity    Alcohol use: Yes     Comment: socially    Drug use: No    Sexual activity: Not on file   Lifestyle    Physical activity:     Days per week: Not on file     Minutes per session: Not on file    Stress: Not on file   Relationships    Social connections:     Talks on phone: Not on file     Gets together: Not on file     Attends Anabaptism service: Not on file     Active member of club or organization: Not on file     Attends meetings of clubs or organizations: Not on file     Relationship status: Not on file   Other Topics Concern    Not on file   Social History Narrative    Not on file         Medications/Allergies: See med card    Vitals:    19 0950   BP: (!) 169/68   Pulse: 60   Resp: 20   Temp: 97.6 °F (36.4 °C)   TempSrc: Oral   SpO2: 98%   Weight: 73.5 kg (162 lb 0.6 oz)   Height: 5' 2" (1.575 m)   PainSc:   8   PainLoc: Back         Physical exam:  Gen: A and O x3, pleasant, well-groomed  Skin: No rashes or obvious lesions  HEENT: PERRLA, no obvious deformities " on ears or in canals. Trachea midline.  CVS: Regular rate and rhythm, normal palpable pulses.  Resp:No increased work of breathing, symmetrical chest rise.  Abdomen: Soft, NT/ND.  Musculoskeletal:No antalgic gait.     Neuro:  Lower extremities: 5/5 strength bilaterally, except for left hip flexion 4/5  Reflexes: Patellar 0+, Achilles 0+ bilaterally.  Sensory:  Intact and symmetrical to light touch and pinprick in L2-S1 dermatomes bilaterally.    Lumbar spine:  Lumbar spine:  Range of motion is mildly reduced with flexion with no increased pain, moderately reduced with extension with increased pain in the bilateral low back especially with the left oblique extension causing left buttock pain.  Adair's test causes no increased pain on either side.    Supine straight leg raise is negative bilaterally.    Internal and external rotation of the hip causes no increased pain on either side.  Myofascial exam: No tenderness to palpation across lumbar paraspinous muscles.  No tenderness palpation over the greater trochanter bursa on either side.    Imagin19 MRI L-spine:  T12-L1: There is right facet joint arthropathy with ligamentum flavum thickening contributing to mild right foraminal stenosis.  There is no spinal stenosis.  L1-2: There is bilateral, left greater than right, facet joint arthropathy with ligamentum flavum thickening and a minimal disc bulge.  There is mild left foraminal stenosis without spinal stenosis.  L2-3: There is marked disc space narrowing.  There is a disc bulge with osteophytic ridging and superimposed broad left paracentral disc protrusion with annular fissure.  There is facet joint arthropathy.  There is no spinal stenosis.  There is mild bilateral foraminal stenosis.  L3-4: There is moderate disc space narrowing.  There is a disc bulge with osteophytic ridging but there's a superimposed left paracentral and foraminal disc extrusion in addition to moderate facet joint arthropathy.   There is no significant spinal stenosis but there is severe left lateral recess and foraminal stenosis with mild-to-moderate right foraminal stenosis.  L4-5: There is marked disc space narrowing.  There's been left laminectomy.  There's right greater than left facet joint arthropathy.  There is a diffuse disc bulge with osteophytic ridging and superimposed large left paracentral disc extrusion contributing to moderate spinal stenosis, severe left and moderate right lateral recess stenosis as well as moderate to severe left and moderate right foraminal stenosis.  L5-S1: There is right greater than left facet joint arthropathy.  There's a mild disc bulge.  There is no significant spinal stenosis.  There is moderate right and mild left foraminal stenosis.    12/11/19 Xray L-spine:  There is a minimal rotary levocurvature of the lower lumbar spine.  There is multilevel degenerative disc and facet disease.  There is no fracture or malalignment and there is no abnormal excursion with flexion or extension.  There's marked disc space narrowing at the L2-3 through L4-5 levels.  There is multilevel facet joint arthropathy, most severe at the L5-S1 level.  There is moderate atherosclerosis.    Assessment:   The patient is a 74-year-old woman with a history of hypertension, CAD on Plavix, history of lumbar surgery who presents in referral from [facility] C[facility], Neurosurgery for back pain and left leg pain.   1. Lumbar radiculopathy  Vital signs    Verify informed consent    Notify physician     Notify physician     Notify physician (specify)    Diet NPO    Case Request Operating Room: Injection,steroid,epidural,transforaminal approach L3/4 and L4/5    Place in Outpatient    alprazolam ODT dissolvable tablet 1 mg         Plan:  1.  We reviewed her symptoms, reviewed her lumbar spine MRI.  She has severe lateral recess narrowing out to the left side at L3/4 in addition to canal stenosis and lateral recess stenosis out to the left  side at L4/5.  This would seem to account for her left leg pain and I explained that she is also showing some signs of weakness in her left hip flexors.  She has had some groin pain with this, no pain on provocative hip joint maneuvers.  She does have an appointment with Dr. Rinaldi, Neurosurgery in several weeks.  We discussed the role of epidural steroid injections in providing some relief and she would like to proceed with this.  I will set her up for a left L3/4 and L4/5 transforaminal injection. I explained that this might help provide some benefit but unclear if this is going to provide lasting relief.  In the meantime I will also have her increase gabapentin up to 200 milligrams twice daily and I have given her prescription for tramadol to use as needed for severe pain. I will have her follow up in several weeks after the injection or sooner as needed.  2.  We will need to get approval to hold Plavix and aspirin for 5-7 days prior to the procedure. Her last stent was over several years ago.    Thank you for referring this interesting patient, and I look forward to continuing to collaborate in her care.

## 2019-12-20 ENCOUNTER — TELEPHONE (OUTPATIENT)
Dept: PAIN MEDICINE | Facility: CLINIC | Age: 74
End: 2019-12-20

## 2019-12-20 NOTE — TELEPHONE ENCOUNTER
----- Message from Giselle Alejo sent at 12/20/2019  4:20 PM CST -----  Type: Needs Medical Advice    Who Called:  patient  Best Call Back Number: [phone] (home)   .Additional Information: Patient wants to know when should she stop plavix she was told it was suppose to be seven days before her procedure please call her to advised asap today

## 2019-12-20 NOTE — TELEPHONE ENCOUNTER
Spoke with patient and advised that we are waiting on a clearance from her cardiologist. She states that she is going to stop her medications this weekend and if we do not get a clearance she will restart next week. She was advised that we cannot tell her to stop medication without the cardiologist approval.

## 2019-12-26 ENCOUNTER — TELEPHONE (OUTPATIENT)
Dept: PAIN MEDICINE | Facility: CLINIC | Age: 74
End: 2019-12-26

## 2019-12-26 NOTE — TELEPHONE ENCOUNTER
----- Message from Rhonda Dickson sent at 12/26/2019 10:16 AM CST -----  Contact: self  Patient is requesting that the nurse call Dr Miguel Ross, he is in the office today, to get her cardio clearance for her procedure tomorrow, office number [phone].  Thank you

## 2019-12-26 NOTE — TELEPHONE ENCOUNTER
Spoke with the nurse at Dr. Velazco's office. They have not received the clearance. This has been faxed back and they will get this to us today.

## 2019-12-27 ENCOUNTER — HOSPITAL ENCOUNTER (OUTPATIENT)
Dept: RADIOLOGY | Facility: HOSPITAL | Age: 74
Discharge: HOME | End: 2019-12-27
Attending: ANESTHESIOLOGY
Payer: MEDICARE

## 2019-12-27 ENCOUNTER — HOSPITAL ENCOUNTER (OUTPATIENT)
Facility: HOSPITAL | Age: 74
Discharge: HOME | End: 2019-12-27
Attending: ANESTHESIOLOGY | Admitting: ANESTHESIOLOGY
Payer: MEDICARE

## 2019-12-27 VITALS
HEIGHT: 62 IN | WEIGHT: 155 LBS | HEART RATE: 65 BPM | RESPIRATION RATE: 15 BRPM | DIASTOLIC BLOOD PRESSURE: 67 MMHG | TEMPERATURE: 98 F | SYSTOLIC BLOOD PRESSURE: 142 MMHG | BODY MASS INDEX: 28.52 KG/M2 | OXYGEN SATURATION: 94 %

## 2019-12-27 DIAGNOSIS — M51.36 DDD (DEGENERATIVE DISC DISEASE), LUMBAR: ICD-10-CM

## 2019-12-27 DIAGNOSIS — M54.16 LUMBAR RADICULOPATHY: Primary | ICD-10-CM

## 2019-12-27 PROCEDURE — 63600175 PHARM REV CODE 636 W HCPCS: Mod: PO | Performed by: ANESTHESIOLOGY

## 2019-12-27 PROCEDURE — 25000003 PHARM REV CODE 250: Mod: PO | Performed by: ANESTHESIOLOGY

## 2019-12-27 PROCEDURE — 64484 NJX AA&/STRD TFRM EPI L/S EA: CPT | Mod: PO | Performed by: ANESTHESIOLOGY

## 2019-12-27 PROCEDURE — 64483 NJX AA&/STRD TFRM EPI L/S 1: CPT | Mod: LT,,, | Performed by: ANESTHESIOLOGY

## 2019-12-27 PROCEDURE — 64483 NJX AA&/STRD TFRM EPI L/S 1: CPT | Mod: PO | Performed by: ANESTHESIOLOGY

## 2019-12-27 PROCEDURE — 64484 NJX AA&/STRD TFRM EPI L/S EA: CPT | Mod: LT,,, | Performed by: ANESTHESIOLOGY

## 2019-12-27 PROCEDURE — 76000 FLUOROSCOPY <1 HR PHYS/QHP: CPT | Mod: TC,PO

## 2019-12-27 PROCEDURE — 64484 PRA INJECT ANES/STEROID FORAMEN LUMBAR/SACRAL W IMG GUIDE ,EA ADD LEVEL: ICD-10-PCS | Mod: LT,,, | Performed by: ANESTHESIOLOGY

## 2019-12-27 PROCEDURE — 25500020 PHARM REV CODE 255: Mod: PO | Performed by: ANESTHESIOLOGY

## 2019-12-27 PROCEDURE — 64483 PR EPIDURAL INJ, ANES/STEROID, TRANSFORAMINAL, LUMB/SACR, SNGL LEVL: ICD-10-PCS | Mod: LT,,, | Performed by: ANESTHESIOLOGY

## 2019-12-27 RX ORDER — LIDOCAINE HYDROCHLORIDE 10 MG/ML
INJECTION, SOLUTION EPIDURAL; INFILTRATION; INTRACAUDAL; PERINEURAL
Status: DISCONTINUED | OUTPATIENT
Start: 2019-12-27 | End: 2019-12-27 | Stop reason: HOSPADM

## 2019-12-27 RX ORDER — ALPRAZOLAM 0.5 MG/1
1 TABLET, ORALLY DISINTEGRATING ORAL ONCE AS NEEDED
Status: COMPLETED | OUTPATIENT
Start: 2019-12-27 | End: 2019-12-27

## 2019-12-27 RX ORDER — BUPIVACAINE HYDROCHLORIDE 2.5 MG/ML
INJECTION, SOLUTION EPIDURAL; INFILTRATION; INTRACAUDAL
Status: DISCONTINUED | OUTPATIENT
Start: 2019-12-27 | End: 2019-12-27 | Stop reason: HOSPADM

## 2019-12-27 RX ORDER — METHYLPREDNISOLONE ACETATE 80 MG/ML
INJECTION, SUSPENSION INTRA-ARTICULAR; INTRALESIONAL; INTRAMUSCULAR; SOFT TISSUE
Status: DISCONTINUED | OUTPATIENT
Start: 2019-12-27 | End: 2019-12-27 | Stop reason: HOSPADM

## 2019-12-27 RX ADMIN — ALPRAZOLAM 1 MG: 0.5 TABLET, ORALLY DISINTEGRATING ORAL at 10:12

## 2019-12-27 NOTE — OP NOTE
PROCEDURE DATE: 12/27/2019    PROCEDURE: Left L3/4 and L4/5 transforaminal epidural steroid injection under fluoroscopy    DIAGNOSIS: Lumbar  Radiculopathy    Post op diagnosis: Same    PHYSICIAN: Delmer Genao MD    MEDICATIONS INJECTED:  Methylprednisolone 40mg (0.5ml) and 1.5ml 0.25% bupivicaine at each nerve root.     LOCAL ANESTHETIC INJECTED:  Lidocaine 1%. 4 ml per site.    SEDATION MEDICATIONS: none    ESTIMATED BLOOD LOSS:  none    COMPLICATIONS:  none    TECHNIQUE:   A time-out was taken to identify patient and procedure side prior to starting the procedure. The patient was placed in a prone position, prepped and draped in the usual sterile fashion using ChloraPrep and sterile towels.  The area to be injected was determined under fluoroscopic guidance in AP and oblique view.  Local anesthetic was given by raising a wheal and going down to the hub of a 25-gauge 1.5 inch needle.  In oblique view, a 3.5 inch 22-gauge bent-tip spinal needle was introduced towards 6 oclock position of the pedicle of each above named nerve root level.  The needle was walked medially then hinged into the neural foramen and position was confirmed in AP and lateral views.  Omnipaque contrast dye was injected to confirm appropriate placement and that there was no vascular uptake.  After negative aspiration for blood or CSF, the medication was then injected. This was performed at the left L3/4 and L4/5 level(s). The patient tolerated the procedure well.    The patient was monitored after the procedure.  Patient was given post procedure and discharge instructions to follow at home. The patient was discharged in a stable condition.

## 2019-12-27 NOTE — DISCHARGE SUMMARY
[facility]  Discharge Note  Short Stay    Admit Date: 12/27/2019    Discharge Date: 12/27/2019    Attending Physician: Delmer Genao MD     Discharge Provider: Delmer Genao    Diagnoses:  Active Hospital Problems    Diagnosis  POA    *Lumbar radiculopathy [M54.16]  Yes      Resolved Hospital Problems   No resolved problems to display.       Discharged Condition: good    Final Diagnoses: Lumbar radiculopathy [M54.16]    Disposition: Home or Self Care    Hospital Course: no complications, uneventful    Outcome of Hospitalization, Treatment, Procedure, or Surgery:  Patient was admitted for outpatient procedure. The patient underwent procedure without complications and are discharged home    Follow up/Patient Instructions:  Follow up as scheduled in Pain Management clinic in 3-4 weeks/Patient has received instructions and follow up date and time    Medications:  Continue previous medications    Discharge Procedure Orders   Call MD for:  temperature >100.4     Call MD for:  severe uncontrolled pain     Call MD for:  redness, tenderness, or signs of infection (pain, swelling, redness, odor or green/yellow discharge around incision site)     Call MD for:  severe persistent headache     No dressing needed         Discharge Procedure Orders (must include Diet, Follow-up, Activity):   Discharge Procedure Orders (must include Diet, Follow-up, Activity)   Call MD for:  temperature >100.4     Call MD for:  severe uncontrolled pain     Call MD for:  redness, tenderness, or signs of infection (pain, swelling, redness, odor or green/yellow discharge around incision site)     Call MD for:  severe persistent headache     No dressing needed

## 2019-12-27 NOTE — DISCHARGE INSTRUCTIONS
PAIN MANAGEMENT    Home care instructions   Apply ice pack to the injection site for 20 minute prior for the first 24 hours for soreness/discomfort at               injection site   DO NOT USE HEAT FOR 24 HOURS   Keep site clean and dry for 24 hours              MAY SHOWER TOMORROW   Do not drive until tomorrow  Take care when walking after YOUR LUMBAR LOWER BACK STEROID INJECTION    STEROIDS   May take 10-14 days for full effects  Avoid strenuous exercises for 2 days    Resume Aspirin, Plavix, or Coumadin the day after the procedure unless other wise instructed  Resume home medication as prescribed today      CALL PHYSICIAN FOR:   Severe increase in your usual pain or appearance of new pain   Prolonged or increasing weakness or numbness in the legs or arms   Fever greater then 100 degrees F..   Drainage from the incision site, redness, active bleeding or increased swelling at the injection site   Headache that increases when your head is upright and decreases when you lie flat    FOR EMERGENCIES:   Go directly to Emergency Department for Shortness of breath, chest pain, or problems breathing

## 2019-12-27 NOTE — OR NURSING
Per Amna from Dr. Genao's office, cardiac clearance has been uploaded in patient's chart for procedure today.

## 2020-01-07 ENCOUNTER — OFFICE VISIT (OUTPATIENT)
Dept: PAIN MEDICINE | Facility: CLINIC | Age: 75
End: 2020-01-07
Payer: MEDICARE

## 2020-01-07 VITALS
BODY MASS INDEX: 29.6 KG/M2 | DIASTOLIC BLOOD PRESSURE: 59 MMHG | SYSTOLIC BLOOD PRESSURE: 146 MMHG | OXYGEN SATURATION: 95 % | TEMPERATURE: 98 F | HEART RATE: 60 BPM | WEIGHT: 161.81 LBS | RESPIRATION RATE: 18 BRPM

## 2020-01-07 DIAGNOSIS — M54.16 LEFT LUMBAR RADICULOPATHY: Primary | ICD-10-CM

## 2020-01-07 DIAGNOSIS — M47.816 LUMBAR SPONDYLOSIS: ICD-10-CM

## 2020-01-07 DIAGNOSIS — M51.36 DDD (DEGENERATIVE DISC DISEASE), LUMBAR: ICD-10-CM

## 2020-01-07 PROCEDURE — 99999 PR PBB SHADOW E&M-EST. PATIENT-LVL IV: ICD-10-PCS | Mod: PBBFAC,,, | Performed by: ANESTHESIOLOGY

## 2020-01-07 PROCEDURE — 1125F PR PAIN SEVERITY QUANTIFIED, PAIN PRESENT: ICD-10-PCS | Mod: ,,, | Performed by: ANESTHESIOLOGY

## 2020-01-07 PROCEDURE — 1125F AMNT PAIN NOTED PAIN PRSNT: CPT | Mod: ,,, | Performed by: ANESTHESIOLOGY

## 2020-01-07 PROCEDURE — 99214 OFFICE O/P EST MOD 30 MIN: CPT | Mod: PBBFAC,PN | Performed by: ANESTHESIOLOGY

## 2020-01-07 PROCEDURE — 99213 PR OFFICE/OUTPT VISIT, EST, LEVL III, 20-29 MIN: ICD-10-PCS | Mod: S$PBB,,, | Performed by: ANESTHESIOLOGY

## 2020-01-07 PROCEDURE — 1159F PR MEDICATION LIST DOCUMENTED IN MEDICAL RECORD: ICD-10-PCS | Mod: ,,, | Performed by: ANESTHESIOLOGY

## 2020-01-07 PROCEDURE — 99213 OFFICE O/P EST LOW 20 MIN: CPT | Mod: S$PBB,,, | Performed by: ANESTHESIOLOGY

## 2020-01-07 PROCEDURE — 99999 PR PBB SHADOW E&M-EST. PATIENT-LVL IV: CPT | Mod: PBBFAC,,, | Performed by: ANESTHESIOLOGY

## 2020-01-07 PROCEDURE — 1159F MED LIST DOCD IN RCRD: CPT | Mod: ,,, | Performed by: ANESTHESIOLOGY

## 2020-01-07 RX ORDER — ALPRAZOLAM 0.5 MG/1
1 TABLET, ORALLY DISINTEGRATING ORAL ONCE AS NEEDED
Status: CANCELLED | OUTPATIENT
Start: 2020-01-28 | End: 2031-06-25

## 2020-01-07 RX ORDER — TRAMADOL HYDROCHLORIDE 50 MG/1
50 TABLET ORAL 3 TIMES DAILY PRN
Qty: 40 TABLET | Refills: 1 | Status: SHIPPED | OUTPATIENT
Start: 2020-01-07 | End: 2021-03-05 | Stop reason: CLARIF

## 2020-01-07 NOTE — H&P (VIEW-ONLY)
This note was completed with dictation software and grammatical errors may exist.    CC:  Back pain, left leg pain    HPI:  The patient is a 74-year-old woman with a history of hypertension, CAD on Plavix, history of lumbar surgery who presents in referral from [facility] Neurosurgery for back pain and left leg pain. She is status post left L3/4 and L4/5 transforaminal injection on 12/27/2019 with 50% relief of her back pain but no improvement in her left leg pain.  She continues to have pain slightly in the low back, this is much improved but she is still having intense pain beginning around the lateral left knee and along the calf and into the ankle.  Is worse with standing and walking but also with sitting for long periods.  She does have some slight weakness in the leg still, denies any recent falls, her weakness is not worsening, no bowel or bladder incontinence.  She has seen neurosurgery, Dr. Rinaldi who explained that she would need to Level surgery with hardware and advised against this if possible.  She has been taking tramadol with mild relief, gabapentin 200 milligrams twice daily with unclear relief.      Previous history:  The patient reports having back pain for many years.  She does have a history of prior lumbar surgery about 8 years ago with Dr. Gordon with some relief of her back pain, unclear if she ever had any leg pain, nonetheless she was doing okay until recently.  However, in the last 3 months she began developing worsening back pain and pain radiating down her left buttock, posterior thigh and her calf.  She describes the pain is also radiating into the left groin at times.  It is worse with standing and walking, somewhat relieved with sitting down.  She has some bilateral buttock pain and has a history of trochanteric bursa injections with orthopedics which used to provide some relief but the last time it did not provide any benefit.  She denies any liya weakness, denies any bowel or  bladder incontinence.  She was recently started on gabapentin 100 milligrams twice daily but reports that she has not had any relief with this.    Pain intervention history:  She has done some physical therapy in [place] where she lives without much relief.  She has been taking gabapentin 100 milligrams twice daily with no major relief.  She is status post left L3/4 and L4/5 transforaminal injection on 12/27/2019 with 50% relief of her back pain but no improvement in her left leg pain.     ROS:  She reports back pain only.  Balance of review of systems is negative.    Past Medical History:   Diagnosis Date    Anticoagulant long-term use     CAD (coronary artery disease)     Encounter for blood transfusion     Hypertension     AUDREY on CPAP     Thyroid disease        Past Surgical History:   Procedure Laterality Date    BACK SURGERY      CARDIAC SURGERY  2007    x 4 vessels    CAROTID ENDARTERECTOMY Bilateral     each side done twice    CORONARY ANGIOPLASTY WITH STENT PLACEMENT      CORONARY ARTERY BYPASS GRAFT      HYSTERECTOMY      REVISION AMPUTATION OF FINGER Left     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      TRANSFORAMINAL EPIDURAL INJECTION OF STEROID Left 12/27/2019    Procedure: Injection,steroid,epidural,transforaminal approach L3/4 and L4/5;  Surgeon: Delmer Genao MD;  Location: [facility] OR;  Service: Pain Management;  Laterality: Left;    TRIGGER FINGER RELEASE Left 6/21/2018    Procedure: RELEASE-FINGER-TRIGGER-LEFT RING;  Surgeon: Norbert Ruelas MD;  Location: [facility] OR;  Service: Orthopedics;  Laterality: Left;       Social History     Socioeconomic History    Marital status:      Spouse name: Not on file    Number of children: Not on file    Years of education: Not on file    Highest education level: Not on file   Occupational History    Not on file   Social Needs    Financial resource strain: Not on file    Food insecurity:     Worry: Not on file      Inability: Not on file    Transportation needs:     Medical: Not on file     Non-medical: Not on file   Tobacco Use    Smoking status: Former Smoker     Last attempt to quit: 2007     Years since quittin.5    Smokeless tobacco: Never Used   Substance and Sexual Activity    Alcohol use: Yes     Comment: socially    Drug use: No    Sexual activity: Not on file   Lifestyle    Physical activity:     Days per week: Not on file     Minutes per session: Not on file    Stress: Not on file   Relationships    Social connections:     Talks on phone: Not on file     Gets together: Not on file     Attends [employer]ism service: Not on file     Active member of club or organization: Not on file     Attends meetings of clubs or organizations: Not on file     Relationship status: Not on file   Other Topics Concern    Not on file   Social History Narrative    Not on file         Medications/Allergies: See med card    Vitals:    20 0816   BP: (!) 146/59   Pulse: 60   Resp: 18   Temp: 98.3 °F (36.8 °C)   TempSrc: Oral   SpO2: 95%   Weight: 73.4 kg (161 lb 13.1 oz)   PainSc:   4   PainLoc: Leg         Physical exam:  Gen: A and O x3, pleasant, well-groomed  Skin: No rashes or obvious lesions  HEENT: PERRLA, no obvious deformities on ears or in canals. Trachea midline.  CVS: Regular rate and rhythm, normal palpable pulses.  Resp:No increased work of breathing, symmetrical chest rise.  Abdomen: Soft, NT/ND.  Musculoskeletal:No antalgic gait.     Neuro:  Lower extremities: 5/5 strength bilaterally, except for left hip flexion 4/5  Reflexes: Patellar 0+, Achilles 0+ bilaterally.  Sensory:  Intact and symmetrical to light touch and pinprick in L2-S1 dermatomes bilaterally.    Lumbar spine:  Lumbar spine:  Range of motion is mildly reduced with flexion with no increased pain, moderately reduced with extension with increased pain in the bilateral low back especially with the left oblique extension causing left buttock  pain.  Adair's test causes no increased pain on either side.    Supine straight leg raise is negative bilaterally.    Internal and external rotation of the hip causes no increased pain on either side.  Myofascial exam: No tenderness to palpation across lumbar paraspinous muscles.  No tenderness palpation over the greater trochanter bursa on either side.    Imagin19 MRI L-spine:  T12-L1: There is right facet joint arthropathy with ligamentum flavum thickening contributing to mild right foraminal stenosis.  There is no spinal stenosis.  L1-2: There is bilateral, left greater than right, facet joint arthropathy with ligamentum flavum thickening and a minimal disc bulge.  There is mild left foraminal stenosis without spinal stenosis.  L2-3: There is marked disc space narrowing.  There is a disc bulge with osteophytic ridging and superimposed broad left paracentral disc protrusion with annular fissure.  There is facet joint arthropathy.  There is no spinal stenosis.  There is mild bilateral foraminal stenosis.  L3-4: There is moderate disc space narrowing.  There is a disc bulge with osteophytic ridging but there's a superimposed left paracentral and foraminal disc extrusion in addition to moderate facet joint arthropathy.  There is no significant spinal stenosis but there is severe left lateral recess and foraminal stenosis with mild-to-moderate right foraminal stenosis.  L4-5: There is marked disc space narrowing.  There's been left laminectomy.  There's right greater than left facet joint arthropathy.  There is a diffuse disc bulge with osteophytic ridging and superimposed large left paracentral disc extrusion contributing to moderate spinal stenosis, severe left and moderate right lateral recess stenosis as well as moderate to severe left and moderate right foraminal stenosis.  L5-S1: There is right greater than left facet joint arthropathy.  There's a mild disc bulge.  There is no significant spinal  stenosis.  There is moderate right and mild left foraminal stenosis.    12/11/19 Xray L-spine:  There is a minimal rotary levocurvature of the lower lumbar spine.  There is multilevel degenerative disc and facet disease.  There is no fracture or malalignment and there is no abnormal excursion with flexion or extension.  There's marked disc space narrowing at the L2-3 through L4-5 levels.  There is multilevel facet joint arthropathy, most severe at the L5-S1 level.  There is moderate atherosclerosis.    Assessment:   The patient is a 74-year-old woman with a history of hypertension, CAD on Plavix, history of lumbar surgery who presents in referral from [facility] for back pain and left leg pain.   1. Left lumbar radiculopathy     2. DDD (degenerative disc disease), lumbar     3. Lumbar spondylosis         Plan:  1.  We discussed that since she is continuing to have pain in the L4 distribution, has had some improvement with the injection, we are going to repeat the L3/4 and L4/5 transforaminal injection. I will have her stop aspirin and Plavix again, she had approval from her primary care physician for this, it has been several years since her last stent.  2.  I have refilled her tramadol, I will have her continue taking the gabapentin.  3.  Will have her follow up in several weeks after the injection.  She is going to have the injection after she returns from a cruise.  We discussed that we would try the injections to see if she can get further relief because otherwise surgery may need to be an option and it will be somewhat extensive.

## 2020-01-07 NOTE — PROGRESS NOTES
This note was completed with dictation software and grammatical errors may exist.    CC:  Back pain, left leg pain    HPI:  The patient is a 74-year-old woman with a history of hypertension, CAD on Plavix, history of lumbar surgery who presents in referral from [facility] for back pain and left leg pain. She is status post left L3/4 and L4/5 transforaminal injection on 12/27/2019 with 50% relief of her back pain but no improvement in her left leg pain.  She continues to have pain slightly in the low back, this is much improved but she is still having intense pain beginning around the lateral left knee and along the calf and into the ankle.  Is worse with standing and walking but also with sitting for long periods.  She does have some slight weakness in the leg still, denies any recent falls, her weakness is not worsening, no bowel or bladder incontinence.  She has seen neurosurgery, Dr. Rinaldi who explained that she would need to Level surgery with hardware and advised against this if possible.  She has been taking tramadol with mild relief, gabapentin 200 milligrams twice daily with unclear relief.      Previous history:  The patient reports having back pain for many years.  She does have a history of prior lumbar surgery about 8 years ago with Dr. Gordon with some relief of her back pain, unclear if she ever had any leg pain, nonetheless she was doing okay until recently.  However, in the last 3 months she began developing worsening back pain and pain radiating down her left buttock, posterior thigh and her calf.  She describes the pain is also radiating into the left groin at times.  It is worse with standing and walking, somewhat relieved with sitting down.  She has some bilateral buttock pain and has a history of trochanteric bursa injections with orthopedics which used to provide some relief but the last time it did not provide any benefit.  She denies any liya weakness, denies any bowel or  bladder incontinence.  She was recently started on gabapentin 100 milligrams twice daily but reports that she has not had any relief with this.    Pain intervention history:  She has done some physical therapy in [place] where she lives without much relief.  She has been taking gabapentin 100 milligrams twice daily with no major relief.  She is status post left L3/4 and L4/5 transforaminal injection on 12/27/2019 with 50% relief of her back pain but no improvement in her left leg pain.     ROS:  She reports back pain only.  Balance of review of systems is negative.    Past Medical History:   Diagnosis Date    Anticoagulant long-term use     CAD (coronary artery disease)     Encounter for blood transfusion     Hypertension     AUDREY on CPAP     Thyroid disease        Past Surgical History:   Procedure Laterality Date    BACK SURGERY      CARDIAC SURGERY  2007    x 4 vessels    CAROTID ENDARTERECTOMY Bilateral     each side done twice    CORONARY ANGIOPLASTY WITH STENT PLACEMENT      CORONARY ARTERY BYPASS GRAFT      HYSTERECTOMY      REVISION AMPUTATION OF FINGER Left     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      TRANSFORAMINAL EPIDURAL INJECTION OF STEROID Left 12/27/2019    Procedure: Injection,steroid,epidural,transforaminal approach L3/4 and L4/5;  Surgeon: Delmer Genao MD;  Location: [facility] OR;  Service: Pain Management;  Laterality: Left;    TRIGGER FINGER RELEASE Left 6/21/2018    Procedure: RELEASE-FINGER-TRIGGER-LEFT RING;  Surgeon: Norbert Ruelas MD;  Location: [facility] OR;  Service: Orthopedics;  Laterality: Left;       Social History     Socioeconomic History    Marital status:      Spouse name: Not on file    Number of children: Not on file    Years of education: Not on file    Highest education level: Not on file   Occupational History    Not on file   Social Needs    Financial resource strain: Not on file    Food insecurity:     Worry: Not on file      Inability: Not on file    Transportation needs:     Medical: Not on file     Non-medical: Not on file   Tobacco Use    Smoking status: Former Smoker     Last attempt to quit: 2007     Years since quittin.5    Smokeless tobacco: Never Used   Substance and Sexual Activity    Alcohol use: Yes     Comment: socially    Drug use: No    Sexual activity: Not on file   Lifestyle    Physical activity:     Days per week: Not on file     Minutes per session: Not on file    Stress: Not on file   Relationships    Social connections:     Talks on phone: Not on file     Gets together: Not on file     Attends [employer] service: Not on file     Active member of club or organization: Not on file     Attends meetings of clubs or organizations: Not on file     Relationship status: Not on file   Other Topics Concern    Not on file   Social History Narrative    Not on file         Medications/Allergies: See med card    Vitals:    20 0816   BP: (!) 146/59   Pulse: 60   Resp: 18   Temp: 98.3 °F (36.8 °C)   TempSrc: Oral   SpO2: 95%   Weight: 73.4 kg (161 lb 13.1 oz)   PainSc:   4   PainLoc: Leg         Physical exam:  Gen: A and O x3, pleasant, well-groomed  Skin: No rashes or obvious lesions  HEENT: PERRLA, no obvious deformities on ears or in canals. Trachea midline.  CVS: Regular rate and rhythm, normal palpable pulses.  Resp:No increased work of breathing, symmetrical chest rise.  Abdomen: Soft, NT/ND.  Musculoskeletal:No antalgic gait.     Neuro:  Lower extremities: 5/5 strength bilaterally, except for left hip flexion 4/5  Reflexes: Patellar 0+, Achilles 0+ bilaterally.  Sensory:  Intact and symmetrical to light touch and pinprick in L2-S1 dermatomes bilaterally.    Lumbar spine:  Lumbar spine:  Range of motion is mildly reduced with flexion with no increased pain, moderately reduced with extension with increased pain in the bilateral low back especially with the left oblique extension causing left buttock  pain.  Adair's test causes no increased pain on either side.    Supine straight leg raise is negative bilaterally.    Internal and external rotation of the hip causes no increased pain on either side.  Myofascial exam: No tenderness to palpation across lumbar paraspinous muscles.  No tenderness palpation over the greater trochanter bursa on either side.    Imagin19 MRI L-spine:  T12-L1: There is right facet joint arthropathy with ligamentum flavum thickening contributing to mild right foraminal stenosis.  There is no spinal stenosis.  L1-2: There is bilateral, left greater than right, facet joint arthropathy with ligamentum flavum thickening and a minimal disc bulge.  There is mild left foraminal stenosis without spinal stenosis.  L2-3: There is marked disc space narrowing.  There is a disc bulge with osteophytic ridging and superimposed broad left paracentral disc protrusion with annular fissure.  There is facet joint arthropathy.  There is no spinal stenosis.  There is mild bilateral foraminal stenosis.  L3-4: There is moderate disc space narrowing.  There is a disc bulge with osteophytic ridging but there's a superimposed left paracentral and foraminal disc extrusion in addition to moderate facet joint arthropathy.  There is no significant spinal stenosis but there is severe left lateral recess and foraminal stenosis with mild-to-moderate right foraminal stenosis.  L4-5: There is marked disc space narrowing.  There's been left laminectomy.  There's right greater than left facet joint arthropathy.  There is a diffuse disc bulge with osteophytic ridging and superimposed large left paracentral disc extrusion contributing to moderate spinal stenosis, severe left and moderate right lateral recess stenosis as well as moderate to severe left and moderate right foraminal stenosis.  L5-S1: There is right greater than left facet joint arthropathy.  There's a mild disc bulge.  There is no significant spinal  stenosis.  There is moderate right and mild left foraminal stenosis.    12/11/19 Xray L-spine:  There is a minimal rotary levocurvature of the lower lumbar spine.  There is multilevel degenerative disc and facet disease.  There is no fracture or malalignment and there is no abnormal excursion with flexion or extension.  There's marked disc space narrowing at the L2-3 through L4-5 levels.  There is multilevel facet joint arthropathy, most severe at the L5-S1 level.  There is moderate atherosclerosis.    Assessment:   The patient is a 74-year-old woman with a history of hypertension, CAD on Plavix, history of lumbar surgery who presents in referral from [facility] for back pain and left leg pain.   1. Left lumbar radiculopathy     2. DDD (degenerative disc disease), lumbar     3. Lumbar spondylosis         Plan:  1.  We discussed that since she is continuing to have pain in the L4 distribution, has had some improvement with the injection, we are going to repeat the L3/4 and L4/5 transforaminal injection. I will have her stop aspirin and Plavix again, she had approval from her primary care physician for this, it has been several years since her last stent.  2.  I have refilled her tramadol, I will have her continue taking the gabapentin.  3.  Will have her follow up in several weeks after the injection.  She is going to have the injection after she returns from a cruise.  We discussed that we would try the injections to see if she can get further relief because otherwise surgery may need to be an option and it will be somewhat extensive.

## 2020-01-28 ENCOUNTER — HOSPITAL ENCOUNTER (OUTPATIENT)
Dept: RADIOLOGY | Facility: HOSPITAL | Age: 75
Discharge: HOME | End: 2020-01-28
Attending: ANESTHESIOLOGY | Admitting: ANESTHESIOLOGY
Payer: MEDICARE

## 2020-01-28 ENCOUNTER — HOSPITAL ENCOUNTER (OUTPATIENT)
Facility: HOSPITAL | Age: 75
Discharge: HOME | End: 2020-01-28
Attending: ANESTHESIOLOGY | Admitting: ANESTHESIOLOGY
Payer: MEDICARE

## 2020-01-28 DIAGNOSIS — M54.16 LUMBAR RADICULOPATHY: ICD-10-CM

## 2020-01-28 DIAGNOSIS — M54.16 LEFT LUMBAR RADICULOPATHY: Primary | ICD-10-CM

## 2020-01-28 PROCEDURE — 25000003 PHARM REV CODE 250: Mod: PO | Performed by: ANESTHESIOLOGY

## 2020-01-28 PROCEDURE — 76000 FLUOROSCOPY <1 HR PHYS/QHP: CPT | Mod: TC,PO

## 2020-01-28 PROCEDURE — 64483 NJX AA&/STRD TFRM EPI L/S 1: CPT | Mod: PO | Performed by: ANESTHESIOLOGY

## 2020-01-28 PROCEDURE — 64484 NJX AA&/STRD TFRM EPI L/S EA: CPT | Mod: PO | Performed by: ANESTHESIOLOGY

## 2020-01-28 PROCEDURE — 64483 NJX AA&/STRD TFRM EPI L/S 1: CPT | Mod: RT,,, | Performed by: ANESTHESIOLOGY

## 2020-01-28 PROCEDURE — 25500020 PHARM REV CODE 255: Mod: PO | Performed by: ANESTHESIOLOGY

## 2020-01-28 PROCEDURE — 64483 PR EPIDURAL INJ, ANES/STEROID, TRANSFORAMINAL, LUMB/SACR, SNGL LEVL: ICD-10-PCS | Mod: RT,,, | Performed by: ANESTHESIOLOGY

## 2020-01-28 PROCEDURE — 64484 PRA INJECT ANES/STEROID FORAMEN LUMBAR/SACRAL W IMG GUIDE ,EA ADD LEVEL: ICD-10-PCS | Mod: RT,,, | Performed by: ANESTHESIOLOGY

## 2020-01-28 PROCEDURE — 63600175 PHARM REV CODE 636 W HCPCS: Mod: PO | Performed by: ANESTHESIOLOGY

## 2020-01-28 PROCEDURE — 64484 NJX AA&/STRD TFRM EPI L/S EA: CPT | Mod: RT,,, | Performed by: ANESTHESIOLOGY

## 2020-01-28 RX ORDER — ALPRAZOLAM 0.5 MG/1
1 TABLET, ORALLY DISINTEGRATING ORAL ONCE AS NEEDED
Status: COMPLETED | OUTPATIENT
Start: 2020-01-28 | End: 2020-01-28

## 2020-01-28 RX ORDER — BUPIVACAINE HYDROCHLORIDE 2.5 MG/ML
INJECTION, SOLUTION EPIDURAL; INFILTRATION; INTRACAUDAL
Status: DISCONTINUED | OUTPATIENT
Start: 2020-01-28 | End: 2020-01-28 | Stop reason: HOSPADM

## 2020-01-28 RX ORDER — LIDOCAINE HYDROCHLORIDE 10 MG/ML
INJECTION, SOLUTION EPIDURAL; INFILTRATION; INTRACAUDAL; PERINEURAL
Status: DISCONTINUED | OUTPATIENT
Start: 2020-01-28 | End: 2020-01-28 | Stop reason: HOSPADM

## 2020-01-28 RX ORDER — METHYLPREDNISOLONE ACETATE 80 MG/ML
INJECTION, SUSPENSION INTRA-ARTICULAR; INTRALESIONAL; INTRAMUSCULAR; SOFT TISSUE
Status: DISCONTINUED | OUTPATIENT
Start: 2020-01-28 | End: 2020-01-28 | Stop reason: HOSPADM

## 2020-01-28 RX ADMIN — ALPRAZOLAM 1 MG: 0.5 TABLET, ORALLY DISINTEGRATING ORAL at 12:01

## 2020-01-28 NOTE — DISCHARGE SUMMARY
[facility]  Discharge Note  Short Stay    Admit Date: 1/28/2020    Discharge Date: 1/28/2020    Attending Physician: Delmer Genao MD     Discharge Provider: Delmer Genao    Diagnoses:  Active Hospital Problems    Diagnosis  POA    *Left lumbar radiculopathy [M54.16]  Yes      Resolved Hospital Problems   No resolved problems to display.       Discharged Condition: good    Final Diagnoses: Left lumbar radiculopathy [M54.16]    Disposition: Home or Self Care    Hospital Course: no complications, uneventful    Outcome of Hospitalization, Treatment, Procedure, or Surgery:  Patient was admitted for outpatient procedure. The patient underwent procedure without complications and are discharged home    Follow up/Patient Instructions:  Follow up as scheduled in Pain Management clinic in 3-4 weeks/Patient has received instructions and follow up date and time    Medications:  Continue previous medications    Discharge Procedure Orders   Call MD for:  temperature >100.4     Call MD for:  severe uncontrolled pain     Call MD for:  redness, tenderness, or signs of infection (pain, swelling, redness, odor or green/yellow discharge around incision site)     Call MD for:  severe persistent headache     No dressing needed         Discharge Procedure Orders (must include Diet, Follow-up, Activity):   Discharge Procedure Orders (must include Diet, Follow-up, Activity)   Call MD for:  temperature >100.4     Call MD for:  severe uncontrolled pain     Call MD for:  redness, tenderness, or signs of infection (pain, swelling, redness, odor or green/yellow discharge around incision site)     Call MD for:  severe persistent headache     No dressing needed

## 2020-01-28 NOTE — INTERVAL H&P NOTE
The patient has been examined and the H&P has been reviewed:    I concur with the findings and changes have been noted since the H&P was written: She reports that her left leg pain is completely gone and she has the same symptoms on the right leg. We are going to proceed with the injection for the right side.    Anesthesia/Surgery risks, benefits and alternative options discussed and understood by patient/family.      ASA 2, mallampati 3    Active Hospital Problems    Diagnosis  POA    Left lumbar radiculopathy [M54.16]  Yes      Resolved Hospital Problems   No resolved problems to display.

## 2020-01-28 NOTE — OP NOTE
PROCEDURE DATE: 1/28/2020    PROCEDURE: Right L3/4 and L4/5 transforaminal epidural steroid injection under fluoroscopy    DIAGNOSIS: Lumbar  Radiculopathy    Post op diagnosis: Same    PHYSICIAN: Delmer Genao MD    MEDICATIONS INJECTED:  Methylprednisolone 40mg (0.5ml) and 1.5ml 0.25% bupivicaine at each nerve root.     LOCAL ANESTHETIC INJECTED:  Lidocaine 1%. 4 ml per site.    SEDATION MEDICATIONS: none    ESTIMATED BLOOD LOSS:  none    COMPLICATIONS:  none    TECHNIQUE:   A time-out was taken to identify patient and procedure side prior to starting the procedure. The patient was placed in a prone position, prepped and draped in the usual sterile fashion using ChloraPrep and sterile towels.  The area to be injected was determined under fluoroscopic guidance in AP and oblique view.  Local anesthetic was given by raising a wheal and going down to the hub of a 25-gauge 1.5 inch needle.  In oblique view, a 3.5 inch 22-gauge bent-tip spinal needle was introduced towards 6 oclock position of the pedicle of each above named nerve root level.  The needle was walked medially then hinged into the neural foramen and position was confirmed in AP and lateral views.  Omnipaque contrast dye was injected to confirm appropriate placement and that there was no vascular uptake.  After negative aspiration for blood or CSF, the medication was then injected. This was performed at the right L3/4 and L4/5 level(s). The patient tolerated the procedure well.    The patient was monitored after the procedure.  Patient was given post procedure and discharge instructions to follow at home. The patient was discharged in a stable condition.

## 2020-01-28 NOTE — INTERVAL H&P NOTE
The patient has been examined and the H&P has been reviewed:    I concur with the findings and no changes have occurred since H&P was written.    Anesthesia/Surgery risks, benefits and alternative options discussed and understood by patient/family.    ASA 2, mallampati 2      Active Hospital Problems    Diagnosis  POA    Left lumbar radiculopathy [M54.16]  Yes      Resolved Hospital Problems   No resolved problems to display.

## 2020-01-28 NOTE — DISCHARGE INSTRUCTIONS
PAIN MANAGEMENT    Home care instructions   Apply ice pack to the injection site for 20 minute prior for the first 24 hours for soreness/discomfort at injection site   DO NOT USE HEAT FOR 24 HOURS   Keep site clean and dry for 24 hours, remove bandaid when desired   Do not drive until tomorrow  Take care when walking after a lumbar injection     STEROIDS OR RADIOFREQUENCY    May take 10-14 days for full effects  Avoid strenuous exercises for 2 days          Resume Aspirin, Plavix, or Coumadin the day after the procedure unless other wise instructed  Resume home medication as prescribed today      CALL PHYSICIAN FOR:   Severe increase in your usual pain or appearance of new pain   Prolonged or increasing weakness or numbness in the legs or arms   Fever greater then 100 degrees F..   Drainage from the incision site, redness, active bleeding or increased swelling at the injection site   Headache that increases when your head is upright and decreases when you lie flat    FOR EMERGENCIES:   Go directly to Emergency Department for Shortness of breath, chest pain, or problems breathing

## 2020-01-29 VITALS
HEART RATE: 64 BPM | SYSTOLIC BLOOD PRESSURE: 120 MMHG | RESPIRATION RATE: 16 BRPM | TEMPERATURE: 98 F | DIASTOLIC BLOOD PRESSURE: 66 MMHG | OXYGEN SATURATION: 98 %

## 2020-02-27 ENCOUNTER — OFFICE VISIT (OUTPATIENT)
Dept: PAIN MEDICINE | Facility: CLINIC | Age: 75
End: 2020-02-27
Payer: MEDICARE

## 2020-02-27 VITALS
DIASTOLIC BLOOD PRESSURE: 58 MMHG | WEIGHT: 165.38 LBS | HEART RATE: 69 BPM | SYSTOLIC BLOOD PRESSURE: 126 MMHG | BODY MASS INDEX: 30.24 KG/M2 | RESPIRATION RATE: 18 BRPM | TEMPERATURE: 98 F | OXYGEN SATURATION: 96 %

## 2020-02-27 DIAGNOSIS — M51.36 DDD (DEGENERATIVE DISC DISEASE), LUMBAR: ICD-10-CM

## 2020-02-27 DIAGNOSIS — M54.16 LEFT LUMBAR RADICULOPATHY: Primary | ICD-10-CM

## 2020-02-27 PROCEDURE — 99999 PR PBB SHADOW E&M-EST. PATIENT-LVL IV: ICD-10-PCS | Mod: PBBFAC,,, | Performed by: PHYSICIAN ASSISTANT

## 2020-02-27 PROCEDURE — 99213 PR OFFICE/OUTPT VISIT, EST, LEVL III, 20-29 MIN: ICD-10-PCS | Mod: S$PBB,,, | Performed by: PHYSICIAN ASSISTANT

## 2020-02-27 PROCEDURE — 99999 PR PBB SHADOW E&M-EST. PATIENT-LVL IV: CPT | Mod: PBBFAC,,, | Performed by: PHYSICIAN ASSISTANT

## 2020-02-27 PROCEDURE — 99214 OFFICE O/P EST MOD 30 MIN: CPT | Mod: PBBFAC,PN | Performed by: PHYSICIAN ASSISTANT

## 2020-02-27 PROCEDURE — 99213 OFFICE O/P EST LOW 20 MIN: CPT | Mod: S$PBB,,, | Performed by: PHYSICIAN ASSISTANT

## 2020-02-27 RX ORDER — GABAPENTIN 300 MG/1
300 CAPSULE ORAL 2 TIMES DAILY
Qty: 180 CAPSULE | Refills: 3 | Status: SHIPPED | OUTPATIENT
Start: 2020-02-27 | End: 2021-05-07

## 2020-02-27 NOTE — PROGRESS NOTES
This note was completed with dictation software and grammatical errors may exist.    CC:  Back pain, left leg pain    HPI:  The patient is a 74-year-old woman with a history of hypertension, CAD on Plavix, history of lumbar surgery who presents in referral from Vij[facility] Cuevas, Neurosurgery for back pain and left leg pain. She is status post right 3/4 and L4/5 transforaminal epidural steroid injection on 01/28/2020 with 75% relief.  Patient is new to me.  She denies any leg pain at this time. She reports intermittent low back pain only if she overdoes it.  She has been taking gabapentin 200 milligrams the morning and 200 milligrams at night with relief but would like to only have take 2 pills per day instead of 4.  She has been taking tramadol possibly twice a week only when she overdoes it.  She is pleased with the results of the injection. She denies weakness, numbness, bladder or bowel incontinence.      Previous history:  The patient reports having back pain for many years.  She does have a history of prior lumbar surgery about 8 years ago with Dr. Gordon with some relief of her back pain, unclear if she ever had any leg pain, nonetheless she was doing okay until recently.  However, in the last 3 months she began developing worsening back pain and pain radiating down her left buttock, posterior thigh and her calf.  She describes the pain is also radiating into the left groin at times.  It is worse with standing and walking, somewhat relieved with sitting down.  She has some bilateral buttock pain and has a history of trochanteric bursa injections with orthopedics which used to provide some relief but the last time it did not provide any benefit.  She denies any liya weakness, denies any bowel or bladder incontinence.  She was recently started on gabapentin 100 milligrams twice daily but reports that she has not had any relief with this.    Pain intervention history:  She has done some physical therapy in  [place] where she lives without much relief.  She has been taking gabapentin 100 milligrams twice daily with no major relief.  She is status post left L3/4 and L4/5 transforaminal injection on 12/27/2019 with 50% relief of her back pain but no improvement in her left leg pain.  She is status post right 3/4 and L4/5 transforaminal epidural steroid injection on 01/28/2020 with 75% relief.    ROS:  She reports back pain only.  Balance of review of systems is negative.    Past Medical History:   Diagnosis Date    Anticoagulant long-term use     CAD (coronary artery disease)     Encounter for blood transfusion     Hypertension     AUDREY on CPAP     Thyroid disease        Past Surgical History:   Procedure Laterality Date    BACK SURGERY      CARDIAC SURGERY  2007    x 4 vessels    CAROTID ENDARTERECTOMY Bilateral     each side done twice    CORONARY ANGIOPLASTY WITH STENT PLACEMENT      CORONARY ARTERY BYPASS GRAFT      HYSTERECTOMY      REVISION AMPUTATION OF FINGER Left     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      TRANSFORAMINAL EPIDURAL INJECTION OF STEROID Left 12/27/2019    Procedure: Injection,steroid,epidural,transforaminal approach L3/4 and L4/5;  Surgeon: Delmer Genao MD;  Location: [facility] OR;  Service: Pain Management;  Laterality: Left;    TRANSFORAMINAL EPIDURAL INJECTION OF STEROID Right 1/28/2020    Procedure: Injection,steroid,epidural,transforaminal approach L3/4 and L4/5;  Surgeon: Delmer Genao MD;  Location: [facility] OR;  Service: Pain Management;  Laterality: Right;    TRIGGER FINGER RELEASE Left 6/21/2018    Procedure: RELEASE-FINGER-TRIGGER-LEFT RING;  Surgeon: Norbert Ruelas MD;  Location: [facility] OR;  Service: Orthopedics;  Laterality: Left;       Social History     Socioeconomic History    Marital status:      Spouse name: Not on file    Number of children: Not on file    Years of education: Not on file    Highest education level: Not on file    Occupational History    Not on file   Social Needs    Financial resource strain: Not on file    Food insecurity:     Worry: Not on file     Inability: Not on file    Transportation needs:     Medical: Not on file     Non-medical: Not on file   Tobacco Use    Smoking status: Former Smoker     Last attempt to quit: 2007     Years since quittin.6    Smokeless tobacco: Never Used   Substance and Sexual Activity    Alcohol use: Yes     Comment: socially    Drug use: No    Sexual activity: Not on file   Lifestyle    Physical activity:     Days per week: Not on file     Minutes per session: Not on file    Stress: Not on file   Relationships    Social connections:     Talks on phone: Not on file     Gets together: Not on file     Attends Orthodox service: Not on file     Active member of club or organization: Not on file     Attends meetings of clubs or organizations: Not on file     Relationship status: Not on file   Other Topics Concern    Not on file   Social History Narrative    Not on file         Medications/Allergies: See med card    Vitals:    20 0933   BP: (!) 126/58   Pulse: 69   Resp: 18   Temp: 97.5 °F (36.4 °C)   SpO2: 96%   Weight: 75 kg (165 lb 5.5 oz)   PainSc:   2   PainLoc: Back         Physical exam:  Gen: A and O x3, pleasant, well-groomed  Skin: No rashes or obvious lesions  HEENT: PERRLA, no obvious deformities on ears or in canals. Trachea midline.  CVS: Regular rate and rhythm, normal palpable pulses.  Resp:No increased work of breathing, symmetrical chest rise.  Abdomen: Soft, NT/ND.  Musculoskeletal:No antalgic gait.     Neuro:  Lower extremities: 5/5 strength bilaterally, except for left hip flexion 4/5  Reflexes: Patellar 0+, Achilles 0+ bilaterally.  Sensory:  Intact and symmetrical to light touch and pinprick in L2-S1 dermatomes bilaterally.    Lumbar spine:  Lumbar spine:  Range of motion is mildly reduced with flexion and moderately reduced with extension  without increased pain.  Adair's test causes no increased pain on either side.    Supine straight leg raise is negative bilaterally.    Internal and external rotation of the hip causes no increased pain on either side.  Myofascial exam: No tenderness to palpation across lumbar paraspinous muscles.      Imagin19 MRI L-spine:  T12-L1: There is right facet joint arthropathy with ligamentum flavum thickening contributing to mild right foraminal stenosis.  There is no spinal stenosis.  L1-2: There is bilateral, left greater than right, facet joint arthropathy with ligamentum flavum thickening and a minimal disc bulge.  There is mild left foraminal stenosis without spinal stenosis.  L2-3: There is marked disc space narrowing.  There is a disc bulge with osteophytic ridging and superimposed broad left paracentral disc protrusion with annular fissure.  There is facet joint arthropathy.  There is no spinal stenosis.  There is mild bilateral foraminal stenosis.  L3-4: There is moderate disc space narrowing.  There is a disc bulge with osteophytic ridging but there's a superimposed left paracentral and foraminal disc extrusion in addition to moderate facet joint arthropathy.  There is no significant spinal stenosis but there is severe left lateral recess and foraminal stenosis with mild-to-moderate right foraminal stenosis.  L4-5: There is marked disc space narrowing.  There's been left laminectomy.  There's right greater than left facet joint arthropathy.  There is a diffuse disc bulge with osteophytic ridging and superimposed large left paracentral disc extrusion contributing to moderate spinal stenosis, severe left and moderate right lateral recess stenosis as well as moderate to severe left and moderate right foraminal stenosis.  L5-S1: There is right greater than left facet joint arthropathy.  There's a mild disc bulge.  There is no significant spinal stenosis.  There is moderate right and mild left foraminal  stenosis.    12/11/19 Xray L-spine:  There is a minimal rotary levocurvature of the lower lumbar spine.  There is multilevel degenerative disc and facet disease.  There is no fracture or malalignment and there is no abnormal excursion with flexion or extension.  There's marked disc space narrowing at the L2-3 through L4-5 levels.  There is multilevel facet joint arthropathy, most severe at the L5-S1 level.  There is moderate atherosclerosis.    Assessment:   The patient is a 74-year-old woman with a history of hypertension, CAD on Plavix, history of lumbar surgery who presents in referral from [facility], Neurosurgery for back pain and left leg pain.   1. Left lumbar radiculopathy     2. DDD (degenerative disc disease), lumbar         Plan:  1.  The patient now feels that her pain is tolerable following her injections.  We discussed the importance of regular safe exercise and she verbalizes her understanding.  We can repeat injections in the future if necessary.  2.  I provided a prescription for gabapentin 300 milligrams twice a day and she still has Tramadol left from her prescription from Dr. Genao.  She states she is only taking this about twice a week.  3.  Follow-up in 6 months or sooner as needed.

## 2021-01-28 PROBLEM — I73.9 PVD (PERIPHERAL VASCULAR DISEASE): Status: ACTIVE | Noted: 2021-01-28

## 2021-05-07 ENCOUNTER — OFFICE VISIT (OUTPATIENT)
Dept: PAIN MEDICINE | Facility: CLINIC | Age: 76
End: 2021-05-07
Payer: MEDICARE

## 2021-05-07 VITALS
BODY MASS INDEX: 29.67 KG/M2 | DIASTOLIC BLOOD PRESSURE: 62 MMHG | HEIGHT: 62 IN | OXYGEN SATURATION: 97 % | RESPIRATION RATE: 20 BRPM | HEART RATE: 70 BPM | SYSTOLIC BLOOD PRESSURE: 151 MMHG | WEIGHT: 161.25 LBS | TEMPERATURE: 98 F

## 2021-05-07 DIAGNOSIS — I73.9 PVD (PERIPHERAL VASCULAR DISEASE): ICD-10-CM

## 2021-05-07 DIAGNOSIS — M47.816 LUMBAR SPONDYLOSIS: ICD-10-CM

## 2021-05-07 DIAGNOSIS — M54.16 BILATERAL LUMBAR RADICULOPATHY: Primary | ICD-10-CM

## 2021-05-07 DIAGNOSIS — M48.062 SPINAL STENOSIS OF LUMBAR REGION WITH NEUROGENIC CLAUDICATION: ICD-10-CM

## 2021-05-07 PROCEDURE — 3008F BODY MASS INDEX DOCD: CPT | Mod: CPTII,S$GLB,, | Performed by: ANESTHESIOLOGY

## 2021-05-07 PROCEDURE — 3288F PR FALLS RISK ASSESSMENT DOCUMENTED: ICD-10-PCS | Mod: CPTII,S$GLB,, | Performed by: ANESTHESIOLOGY

## 2021-05-07 PROCEDURE — 99999 PR PBB SHADOW E&M-EST. PATIENT-LVL IV: ICD-10-PCS | Mod: PBBFAC,,, | Performed by: ANESTHESIOLOGY

## 2021-05-07 PROCEDURE — 1101F PR PT FALLS ASSESS DOC 0-1 FALLS W/OUT INJ PAST YR: ICD-10-PCS | Mod: CPTII,S$GLB,, | Performed by: ANESTHESIOLOGY

## 2021-05-07 PROCEDURE — 3288F FALL RISK ASSESSMENT DOCD: CPT | Mod: CPTII,S$GLB,, | Performed by: ANESTHESIOLOGY

## 2021-05-07 PROCEDURE — 99214 OFFICE O/P EST MOD 30 MIN: CPT | Mod: S$GLB,,, | Performed by: ANESTHESIOLOGY

## 2021-05-07 PROCEDURE — 1101F PT FALLS ASSESS-DOCD LE1/YR: CPT | Mod: CPTII,S$GLB,, | Performed by: ANESTHESIOLOGY

## 2021-05-07 PROCEDURE — 1159F MED LIST DOCD IN RCRD: CPT | Mod: S$GLB,,, | Performed by: ANESTHESIOLOGY

## 2021-05-07 PROCEDURE — 1125F PR PAIN SEVERITY QUANTIFIED, PAIN PRESENT: ICD-10-PCS | Mod: S$GLB,,, | Performed by: ANESTHESIOLOGY

## 2021-05-07 PROCEDURE — 1159F PR MEDICATION LIST DOCUMENTED IN MEDICAL RECORD: ICD-10-PCS | Mod: S$GLB,,, | Performed by: ANESTHESIOLOGY

## 2021-05-07 PROCEDURE — 99999 PR PBB SHADOW E&M-EST. PATIENT-LVL IV: CPT | Mod: PBBFAC,,, | Performed by: ANESTHESIOLOGY

## 2021-05-07 PROCEDURE — 99214 PR OFFICE/OUTPT VISIT, EST, LEVL IV, 30-39 MIN: ICD-10-PCS | Mod: S$GLB,,, | Performed by: ANESTHESIOLOGY

## 2021-05-07 PROCEDURE — 1125F AMNT PAIN NOTED PAIN PRSNT: CPT | Mod: S$GLB,,, | Performed by: ANESTHESIOLOGY

## 2021-05-07 PROCEDURE — 3008F PR BODY MASS INDEX (BMI) DOCUMENTED: ICD-10-PCS | Mod: CPTII,S$GLB,, | Performed by: ANESTHESIOLOGY

## 2021-05-07 RX ORDER — ACETAMINOPHEN AND CODEINE PHOSPHATE 300; 30 MG/1; MG/1
1 TABLET ORAL EVERY 8 HOURS PRN
Qty: 20 TABLET | Refills: 0 | Status: SHIPPED | OUTPATIENT
Start: 2021-05-07 | End: 2021-05-17

## 2021-05-18 ENCOUNTER — TELEPHONE (OUTPATIENT)
Dept: PAIN MEDICINE | Facility: CLINIC | Age: 76
End: 2021-05-18

## 2021-05-21 ENCOUNTER — TELEPHONE (OUTPATIENT)
Dept: PAIN MEDICINE | Facility: CLINIC | Age: 76
End: 2021-05-21

## 2021-05-28 ENCOUNTER — TELEPHONE (OUTPATIENT)
Dept: PAIN MEDICINE | Facility: CLINIC | Age: 76
End: 2021-05-28

## 2021-06-07 DIAGNOSIS — M54.16 LUMBAR RADICULOPATHY: Primary | ICD-10-CM

## 2021-06-07 RX ORDER — ALPRAZOLAM 0.5 MG/1
1 TABLET, ORALLY DISINTEGRATING ORAL ONCE AS NEEDED
Status: CANCELLED | OUTPATIENT
Start: 2021-06-22 | End: 2032-11-17

## 2021-06-08 ENCOUNTER — TELEPHONE (OUTPATIENT)
Dept: PAIN MEDICINE | Facility: CLINIC | Age: 76
End: 2021-06-08

## 2021-06-21 ENCOUNTER — TELEPHONE (OUTPATIENT)
Dept: PAIN MEDICINE | Facility: CLINIC | Age: 76
End: 2021-06-21

## 2021-06-21 DIAGNOSIS — M54.16 BILATERAL LUMBAR RADICULOPATHY: Primary | ICD-10-CM

## 2021-06-22 ENCOUNTER — HOSPITAL ENCOUNTER (OUTPATIENT)
Facility: HOSPITAL | Age: 76
Discharge: HOME | End: 2021-06-22
Attending: ANESTHESIOLOGY | Admitting: ANESTHESIOLOGY
Payer: MEDICARE

## 2021-06-22 ENCOUNTER — HOSPITAL ENCOUNTER (OUTPATIENT)
Dept: RADIOLOGY | Facility: HOSPITAL | Age: 76
Discharge: HOME | End: 2021-06-22
Attending: ANESTHESIOLOGY
Payer: MEDICARE

## 2021-06-22 VITALS
WEIGHT: 156 LBS | BODY MASS INDEX: 28.71 KG/M2 | DIASTOLIC BLOOD PRESSURE: 65 MMHG | HEART RATE: 70 BPM | HEIGHT: 62 IN | TEMPERATURE: 98 F | OXYGEN SATURATION: 94 % | RESPIRATION RATE: 16 BRPM | SYSTOLIC BLOOD PRESSURE: 137 MMHG

## 2021-06-22 DIAGNOSIS — M54.16 BILATERAL LUMBAR RADICULOPATHY: ICD-10-CM

## 2021-06-22 DIAGNOSIS — M54.16 LUMBAR RADICULOPATHY: ICD-10-CM

## 2021-06-22 PROCEDURE — 25500020 PHARM REV CODE 255: Mod: PO | Performed by: ANESTHESIOLOGY

## 2021-06-22 PROCEDURE — 64483 NJX AA&/STRD TFRM EPI L/S 1: CPT | Mod: 50,PO | Performed by: ANESTHESIOLOGY

## 2021-06-22 PROCEDURE — 76000 FLUOROSCOPY <1 HR PHYS/QHP: CPT | Mod: TC,PO

## 2021-06-22 PROCEDURE — 64483 NJX AA&/STRD TFRM EPI L/S 1: CPT | Mod: 50,,, | Performed by: ANESTHESIOLOGY

## 2021-06-22 PROCEDURE — 63600175 PHARM REV CODE 636 W HCPCS: Mod: PO | Performed by: ANESTHESIOLOGY

## 2021-06-22 PROCEDURE — 25000003 PHARM REV CODE 250: Mod: PO | Performed by: ANESTHESIOLOGY

## 2021-06-22 PROCEDURE — 64483 PR EPIDURAL INJ, ANES/STEROID, TRANSFORAMINAL, LUMB/SACR, SNGL LEVL: ICD-10-PCS | Mod: 50,,, | Performed by: ANESTHESIOLOGY

## 2021-06-22 RX ORDER — BUPIVACAINE HYDROCHLORIDE 2.5 MG/ML
INJECTION, SOLUTION EPIDURAL; INFILTRATION; INTRACAUDAL
Status: DISCONTINUED | OUTPATIENT
Start: 2021-06-22 | End: 2021-06-22 | Stop reason: HOSPADM

## 2021-06-22 RX ORDER — METHYLPREDNISOLONE ACETATE 80 MG/ML
INJECTION, SUSPENSION INTRA-ARTICULAR; INTRALESIONAL; INTRAMUSCULAR; SOFT TISSUE
Status: DISCONTINUED | OUTPATIENT
Start: 2021-06-22 | End: 2021-06-22 | Stop reason: HOSPADM

## 2021-06-22 RX ORDER — ALPRAZOLAM 0.5 MG/1
1 TABLET, ORALLY DISINTEGRATING ORAL ONCE AS NEEDED
Status: COMPLETED | OUTPATIENT
Start: 2021-06-22 | End: 2021-06-22

## 2021-06-22 RX ORDER — LIDOCAINE HYDROCHLORIDE 10 MG/ML
INJECTION, SOLUTION EPIDURAL; INFILTRATION; INTRACAUDAL; PERINEURAL
Status: DISCONTINUED | OUTPATIENT
Start: 2021-06-22 | End: 2021-06-22 | Stop reason: HOSPADM

## 2021-06-22 RX ADMIN — ALPRAZOLAM 1 MG: 0.5 TABLET, ORALLY DISINTEGRATING ORAL at 02:06

## 2021-07-13 ENCOUNTER — OFFICE VISIT (OUTPATIENT)
Dept: PAIN MEDICINE | Facility: CLINIC | Age: 76
End: 2021-07-13
Payer: MEDICARE

## 2021-07-13 VITALS
RESPIRATION RATE: 18 BRPM | HEART RATE: 62 BPM | DIASTOLIC BLOOD PRESSURE: 69 MMHG | OXYGEN SATURATION: 97 % | TEMPERATURE: 98 F | SYSTOLIC BLOOD PRESSURE: 151 MMHG | WEIGHT: 158.75 LBS | BODY MASS INDEX: 29.03 KG/M2

## 2021-07-13 DIAGNOSIS — M47.816 LUMBAR SPONDYLOSIS: ICD-10-CM

## 2021-07-13 DIAGNOSIS — M48.061 SPINAL STENOSIS OF LUMBAR REGION WITHOUT NEUROGENIC CLAUDICATION: ICD-10-CM

## 2021-07-13 DIAGNOSIS — M54.16 LUMBAR RADICULOPATHY: Primary | ICD-10-CM

## 2021-07-13 PROCEDURE — 1101F PT FALLS ASSESS-DOCD LE1/YR: CPT | Mod: CPTII,S$GLB,, | Performed by: PHYSICIAN ASSISTANT

## 2021-07-13 PROCEDURE — 99999 PR PBB SHADOW E&M-EST. PATIENT-LVL IV: ICD-10-PCS | Mod: PBBFAC,,, | Performed by: PHYSICIAN ASSISTANT

## 2021-07-13 PROCEDURE — 1125F PR PAIN SEVERITY QUANTIFIED, PAIN PRESENT: ICD-10-PCS | Mod: CPTII,S$GLB,, | Performed by: PHYSICIAN ASSISTANT

## 2021-07-13 PROCEDURE — 1125F AMNT PAIN NOTED PAIN PRSNT: CPT | Mod: CPTII,S$GLB,, | Performed by: PHYSICIAN ASSISTANT

## 2021-07-13 PROCEDURE — 99213 OFFICE O/P EST LOW 20 MIN: CPT | Mod: S$GLB,,, | Performed by: PHYSICIAN ASSISTANT

## 2021-07-13 PROCEDURE — 3008F BODY MASS INDEX DOCD: CPT | Mod: CPTII,S$GLB,, | Performed by: PHYSICIAN ASSISTANT

## 2021-07-13 PROCEDURE — 1101F PR PT FALLS ASSESS DOC 0-1 FALLS W/OUT INJ PAST YR: ICD-10-PCS | Mod: CPTII,S$GLB,, | Performed by: PHYSICIAN ASSISTANT

## 2021-07-13 PROCEDURE — 99213 PR OFFICE/OUTPT VISIT, EST, LEVL III, 20-29 MIN: ICD-10-PCS | Mod: S$GLB,,, | Performed by: PHYSICIAN ASSISTANT

## 2021-07-13 PROCEDURE — 3008F PR BODY MASS INDEX (BMI) DOCUMENTED: ICD-10-PCS | Mod: CPTII,S$GLB,, | Performed by: PHYSICIAN ASSISTANT

## 2021-07-13 PROCEDURE — 1159F MED LIST DOCD IN RCRD: CPT | Mod: S$GLB,,, | Performed by: PHYSICIAN ASSISTANT

## 2021-07-13 PROCEDURE — 1159F PR MEDICATION LIST DOCUMENTED IN MEDICAL RECORD: ICD-10-PCS | Mod: S$GLB,,, | Performed by: PHYSICIAN ASSISTANT

## 2021-07-13 PROCEDURE — 3288F FALL RISK ASSESSMENT DOCD: CPT | Mod: CPTII,S$GLB,, | Performed by: PHYSICIAN ASSISTANT

## 2021-07-13 PROCEDURE — 99999 PR PBB SHADOW E&M-EST. PATIENT-LVL IV: CPT | Mod: PBBFAC,,, | Performed by: PHYSICIAN ASSISTANT

## 2021-07-13 PROCEDURE — 3288F PR FALLS RISK ASSESSMENT DOCUMENTED: ICD-10-PCS | Mod: CPTII,S$GLB,, | Performed by: PHYSICIAN ASSISTANT

## 2022-01-31 ENCOUNTER — TELEPHONE (OUTPATIENT)
Dept: PAIN MEDICINE | Facility: CLINIC | Age: 77
End: 2022-01-31
Payer: MEDICARE

## 2022-01-31 RX ORDER — GABAPENTIN 300 MG/1
300 CAPSULE ORAL 2 TIMES DAILY
Qty: 180 CAPSULE | Refills: 3 | Status: CANCELLED | OUTPATIENT
Start: 2022-01-31

## 2022-01-31 NOTE — TELEPHONE ENCOUNTER
Refill not needed, still has one on file with [facility]. Confirmed with the pharmacy, they will fill Rx. Pt notified and verbalized understanding.

## 2022-01-31 NOTE — TELEPHONE ENCOUNTER
----- Message from Raquel Maldonado sent at 1/31/2022 11:14 AM CST -----  Type:  RX Refill Request    Who Called: pt    RX Name and Strength: gabapentin (NEURONTIN) 300 MG capsule    How is the patient currently taking it? (ex. 1XDay): 2 x a day    Is this a 30 day or 90 day RX: 90    Preferred Pharmacy with phone number: [facility] - [facility] [address]   Phone:  [phone]  Fax:  [phone]          Local or Mail Order: mail     Ordering Provider:  DR Parkinson    Would the patient rather a call back or a response via [facility]?  Call     Best Call Back Number: Click to [phone]    Additional Information:  refill

## 2022-04-08 PROBLEM — I25.10 CORONARY ARTERY DISEASE: Status: ACTIVE | Noted: 2022-04-08

## 2022-05-26 ENCOUNTER — OFFICE VISIT (OUTPATIENT)
Dept: PAIN MEDICINE | Facility: CLINIC | Age: 77
End: 2022-05-26
Payer: MEDICARE

## 2022-05-26 ENCOUNTER — TELEPHONE (OUTPATIENT)
Dept: PAIN MEDICINE | Facility: CLINIC | Age: 77
End: 2022-05-26

## 2022-05-26 VITALS
SYSTOLIC BLOOD PRESSURE: 90 MMHG | WEIGHT: 157.88 LBS | HEIGHT: 62 IN | DIASTOLIC BLOOD PRESSURE: 50 MMHG | BODY MASS INDEX: 29.05 KG/M2 | HEART RATE: 80 BPM

## 2022-05-26 DIAGNOSIS — G89.29 CHRONIC HIP PAIN, BILATERAL: ICD-10-CM

## 2022-05-26 DIAGNOSIS — M47.816 LUMBAR SPONDYLOSIS: ICD-10-CM

## 2022-05-26 DIAGNOSIS — M25.551 CHRONIC HIP PAIN, BILATERAL: ICD-10-CM

## 2022-05-26 DIAGNOSIS — M54.16 LUMBAR RADICULOPATHY: Primary | ICD-10-CM

## 2022-05-26 DIAGNOSIS — M25.552 CHRONIC HIP PAIN, BILATERAL: ICD-10-CM

## 2022-05-26 DIAGNOSIS — M48.061 SPINAL STENOSIS OF LUMBAR REGION WITHOUT NEUROGENIC CLAUDICATION: Primary | ICD-10-CM

## 2022-05-26 PROCEDURE — 1159F MED LIST DOCD IN RCRD: CPT | Mod: CPTII,S$GLB,, | Performed by: ANESTHESIOLOGY

## 2022-05-26 PROCEDURE — 1101F PR PT FALLS ASSESS DOC 0-1 FALLS W/OUT INJ PAST YR: ICD-10-PCS | Mod: CPTII,S$GLB,, | Performed by: ANESTHESIOLOGY

## 2022-05-26 PROCEDURE — 99214 OFFICE O/P EST MOD 30 MIN: CPT | Mod: S$GLB,,, | Performed by: ANESTHESIOLOGY

## 2022-05-26 PROCEDURE — 4010F ACE/ARB THERAPY RXD/TAKEN: CPT | Mod: CPTII,S$GLB,, | Performed by: ANESTHESIOLOGY

## 2022-05-26 PROCEDURE — 3288F FALL RISK ASSESSMENT DOCD: CPT | Mod: CPTII,S$GLB,, | Performed by: ANESTHESIOLOGY

## 2022-05-26 PROCEDURE — 4010F PR ACE/ARB THEARPY RXD/TAKEN: ICD-10-PCS | Mod: CPTII,S$GLB,, | Performed by: ANESTHESIOLOGY

## 2022-05-26 PROCEDURE — 1125F PR PAIN SEVERITY QUANTIFIED, PAIN PRESENT: ICD-10-PCS | Mod: CPTII,S$GLB,, | Performed by: ANESTHESIOLOGY

## 2022-05-26 PROCEDURE — 3078F DIAST BP <80 MM HG: CPT | Mod: CPTII,S$GLB,, | Performed by: ANESTHESIOLOGY

## 2022-05-26 PROCEDURE — 1159F PR MEDICATION LIST DOCUMENTED IN MEDICAL RECORD: ICD-10-PCS | Mod: CPTII,S$GLB,, | Performed by: ANESTHESIOLOGY

## 2022-05-26 PROCEDURE — 3074F SYST BP LT 130 MM HG: CPT | Mod: CPTII,S$GLB,, | Performed by: ANESTHESIOLOGY

## 2022-05-26 PROCEDURE — 3008F BODY MASS INDEX DOCD: CPT | Mod: CPTII,S$GLB,, | Performed by: ANESTHESIOLOGY

## 2022-05-26 PROCEDURE — 99214 PR OFFICE/OUTPT VISIT, EST, LEVL IV, 30-39 MIN: ICD-10-PCS | Mod: S$GLB,,, | Performed by: ANESTHESIOLOGY

## 2022-05-26 PROCEDURE — 3074F PR MOST RECENT SYSTOLIC BLOOD PRESSURE < 130 MM HG: ICD-10-PCS | Mod: CPTII,S$GLB,, | Performed by: ANESTHESIOLOGY

## 2022-05-26 PROCEDURE — 3008F PR BODY MASS INDEX (BMI) DOCUMENTED: ICD-10-PCS | Mod: CPTII,S$GLB,, | Performed by: ANESTHESIOLOGY

## 2022-05-26 PROCEDURE — 99999 PR PBB SHADOW E&M-EST. PATIENT-LVL III: CPT | Mod: PBBFAC,,, | Performed by: ANESTHESIOLOGY

## 2022-05-26 PROCEDURE — 1101F PT FALLS ASSESS-DOCD LE1/YR: CPT | Mod: CPTII,S$GLB,, | Performed by: ANESTHESIOLOGY

## 2022-05-26 PROCEDURE — 1125F AMNT PAIN NOTED PAIN PRSNT: CPT | Mod: CPTII,S$GLB,, | Performed by: ANESTHESIOLOGY

## 2022-05-26 PROCEDURE — 3288F PR FALLS RISK ASSESSMENT DOCUMENTED: ICD-10-PCS | Mod: CPTII,S$GLB,, | Performed by: ANESTHESIOLOGY

## 2022-05-26 PROCEDURE — 3078F PR MOST RECENT DIASTOLIC BLOOD PRESSURE < 80 MM HG: ICD-10-PCS | Mod: CPTII,S$GLB,, | Performed by: ANESTHESIOLOGY

## 2022-05-26 PROCEDURE — 99999 PR PBB SHADOW E&M-EST. PATIENT-LVL III: ICD-10-PCS | Mod: PBBFAC,,, | Performed by: ANESTHESIOLOGY

## 2022-05-26 RX ORDER — ALPRAZOLAM 0.5 MG/1
1 TABLET, ORALLY DISINTEGRATING ORAL ONCE AS NEEDED
Status: CANCELLED | OUTPATIENT
Start: 2022-05-26 | End: 2033-10-22

## 2022-05-26 NOTE — H&P (VIEW-ONLY)
This note was completed with dictation software and grammatical errors may exist.    CC:  Back pain, left leg pain    HPI:  The patient is a 76-year-old woman with a history of hypertension, CAD on Plavix, history of lumbar surgery who presents in referral from [facility] Cue[facility], Neurosurgery for back pain and left leg pain.  She returns in follow-up today for back pain, bilateral hip pain.  She states that over the last couple months she has had severe pain in the lateral hips, bilateral groin and buttock and also pain across the low back.  She had recently seen Norbert Ruelas, Orthopedics who recommended a right hip joint injection under x-ray, this was completed on 05/11/2022.  She actually had 100% relief of her right groin, buttock pain for a week to the point where she was able to walk long distances without pain whatsoever.  Otherwise she states that if she walks for a couple minutes the pain becomes severe in the bilateral hips and groin, even in the low back.  She also feels that she has pain that radiates along her anterior thighs and into her anterior shins.  She denies any weakness in the legs.  She feels that she has similar pain on the right side her hip, groin as she does on the left side.      Pain intervention history:  She has done some physical therapy in [place] where she lives without much relief.  She has been taking gabapentin 100 milligrams twice daily with no major relief.  She is status post left L3/4 and L4/5 transforaminal injection on 12/27/2019 with 50% relief of her back pain but no improvement in her left leg pain.  She is status post right 3/4 and L4/5 transforaminal epidural steroid injection on 01/28/2020 with 75% relief.  Has had GTB injections with no major relief.She is status post right 3/4 and L4/5 transforaminal epidural steroid injection on 01/28/2020 with 75% relief.    She is status post bilateral L4/5 transforaminal epidural steroid injections on 06/22/2021 with 80%  relief.     Spine surgeries:  Lumbar surgery with Dr. Gordon, likely left L4/5 hemilaminectomy    Antineuropathics:  Gabapentin 300 3 times daily unclear benefit  NSAIDs:  Mobic 15  Physical therapy:  Antidepressants:  Muscle relaxers:  Opioids:  Antiplatelets/Anticoagulants:  Plavix, aspirin      ROS:  She reports back pain only.  Balance of review of systems is negative.    Past Medical History:   Diagnosis Date    Anticoagulant long-term use     CAD (coronary artery disease)     cabg x 4 2007; stents    DDD (degenerative disc disease), lumbar     Encounter for blood transfusion     Hypertension     AUDREY on CPAP     PVD (peripheral vascular disease)     Thyroid disease        Past Surgical History:   Procedure Laterality Date    ARTERIOGRAPHY OF AORTIC ROOT N/A 4/8/2022    Procedure: ARTERIOGRAM, AORTIC ROOT;  Surgeon: Miguel Ross III, MD;  Location: [facility] CATH;  Service: Cardiology;  Laterality: N/A;    BACK SURGERY      lumbar laminec    CARDIAC SURGERY  2007    x 4 vessels    CAROTID ENDARTERECTOMY Bilateral     each side done twice    CORONARY ANGIOPLASTY WITH STENT PLACEMENT      CORONARY ARTERY BYPASS GRAFT      CORONARY BYPASS GRAFT ANGIOGRAPHY  4/8/2022    Procedure: Bypass graft study;  Surgeon: Miguel Ross III, MD;  Location: [facility] CATH;  Service: Cardiology;;    HYSTERECTOMY      LEFT HEART CATHETERIZATION Left 4/8/2022    Procedure: CATHETERIZATION, HEART, LEFT;  Surgeon: Miguel Ross III, MD;  Location: [facility] CATH;  Service: Cardiology;  Laterality: Left;    PERIPHERAL ANGIOGRAPHY N/A 3/9/2021    Procedure: ANGIOGRAM, PERIPHERAL;  Surgeon: Miguel Goldsmith MD;  Location: [facility] CATH;  Service: Cardiology;  Laterality: N/A;    REVISION AMPUTATION OF FINGER Left     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      TRANSFORAMINAL EPIDURAL INJECTION OF STEROID Left 12/27/2019    Procedure: Injection,steroid,epidural,transforaminal approach L3/4 and L4/5;  Surgeon: Delmer Genao,  "MD;  Location: [facility] OR;  Service: Pain Management;  Laterality: Left;    TRANSFORAMINAL EPIDURAL INJECTION OF STEROID Right 2020    Procedure: Injection,steroid,epidural,transforaminal approach L3/4 and L4/5;  Surgeon: Delmer Genao MD;  Location: [facility] OR;  Service: Pain Management;  Laterality: Right;    TRANSFORAMINAL EPIDURAL INJECTION OF STEROID Bilateral 2021    Procedure: Injection,steroid,epidural,transforaminal approach L4/5;  Surgeon: Delmer Genao MD;  Location: [facility] OR;  Service: Pain Management;  Laterality: Bilateral;    TRIGGER FINGER RELEASE Left 2018    Procedure: RELEASE-FINGER-TRIGGER-LEFT RING;  Surgeon: Norbert Ruelas MD;  Location: [facility] OR;  Service: Orthopedics;  Laterality: Left;    VASCULAR SURGERY         Social History     Socioeconomic History    Marital status:    Tobacco Use    Smoking status: Former Smoker     Packs/day: 1.50     Years: 40.00     Pack years: 60.00     Types: Cigarettes     Quit date: 2007     Years since quittin.8    Smokeless tobacco: Never Used   Substance and Sexual Activity    Alcohol use: Not Currently     Alcohol/week: 2.0 standard drinks     Types: 2 Glasses of wine per week     Comment: socially    Drug use: No         Medications/Allergies: See med card    Vitals:    22 0848   BP: (!) 90/50   Pulse: 80   Weight: 71.6 kg (157 lb 13.6 oz)   Height: 5' 2.4" (1.585 m)   PainSc:   1   PainLoc: Back         Physical exam:  Gen: A and O x3, pleasant, well-groomed  Skin: No rashes or obvious lesions  HEENT: PERRLA, no obvious deformities on ears or in canals. Trachea midline.  CVS: Regular rate and rhythm, normal palpable pulses.  Resp:No increased work of breathing, symmetrical chest rise.  Abdomen: Soft, NT/ND.  Musculoskeletal:No antalgic gait.     Neuro:  Lower extremities: 5/5 strength bilaterally, except for left hip flexion 4/5  Reflexes: Patellar 0+, Achilles 0+ bilaterally.  Sensory:  Intact and " symmetrical to light touch and pinprick in L2-S1 dermatomes bilaterally.    Lumbar spine:  Lumbar spine:  Range of motion is mildly reduced with flexion and moderately reduced with extension with increased pain on extension, no pain with flexion.    Adair's test causes no increased pain on either side.    Supine straight leg raise is negative bilaterally.    Internal and external rotation of the hip causes no increased pain on either side.  Myofascial exam: No tenderness to palpation across lumbar paraspinous muscles.      Imagin19 MRI L-spine:  T12-L1: There is right facet joint arthropathy with ligamentum flavum thickening contributing to mild right foraminal stenosis.  There is no spinal stenosis.  L1-2: There is bilateral, left greater than right, facet joint arthropathy with ligamentum flavum thickening and a minimal disc bulge.  There is mild left foraminal stenosis without spinal stenosis.  L2-3: There is marked disc space narrowing.  There is a disc bulge with osteophytic ridging and superimposed broad left paracentral disc protrusion with annular fissure.  There is facet joint arthropathy.  There is no spinal stenosis.  There is mild bilateral foraminal stenosis.  L3-4: There is moderate disc space narrowing.  There is a disc bulge with osteophytic ridging but there's a superimposed left paracentral and foraminal disc extrusion in addition to moderate facet joint arthropathy.  There is no significant spinal stenosis but there is severe left lateral recess and foraminal stenosis with mild-to-moderate right foraminal stenosis.  L4-5: There is marked disc space narrowing.  There's been left laminectomy.  There's right greater than left facet joint arthropathy.  There is a diffuse disc bulge with osteophytic ridging and superimposed large left paracentral disc extrusion contributing to moderate spinal stenosis, severe left and moderate right lateral recess stenosis as well as moderate to severe left  and moderate right foraminal stenosis.  L5-S1: There is right greater than left facet joint arthropathy.  There's a mild disc bulge.  There is no significant spinal stenosis.  There is moderate right and mild left foraminal stenosis.    12/11/19 Xray L-spine:  There is a minimal rotary levocurvature of the lower lumbar spine.  There is multilevel degenerative disc and facet disease.  There is no fracture or malalignment and there is no abnormal excursion with flexion or extension.  There's marked disc space narrowing at the L2-3 through L4-5 levels.  There is multilevel facet joint arthropathy, most severe at the L5-S1 level.  There is moderate atherosclerosis.    4/19/22 Xray bilateral hips:  Moderate bilateral femoroacetabular osteoarthritis.    Assessment:   The patient is a 76-year-old woman with a history of hypertension, CAD on Plavix, history of lumbar surgery who presents in referral from [facility] Cue[facility], Neurosurgery for back pain and left leg pain.   1. Spinal stenosis of lumbar region without neurogenic claudication     2. Lumbar spondylosis     3. Chronic hip pain, bilateral         Plan:  1.  We discussed that she has significant pain in the bilateral hips, does have osteoarthritis in both hips noted on x-ray.  On exam, provocative maneuvers do not seem to worsen her hip joint pain much.  However, she had such significant relief with the right hip joint injection, this would seem to be diagnostic.  Nonetheless, it only helped for a week and so the next step would likely need to be hip replacement.  We are going to try a bilateral L4/5 transforaminal injection to see if this helps the back pain but perhaps even the hip and leg pain.  I explained that this could be diagnostic in that if it helps the hips and legs, we may assume that her pain is lumbar radicular in nature.  If it does not help the hips at all, she will follow up with Dr. Norbert Ruelas to discuss further treatment options.  2. If she gets some  relief of the back but only temporarily, she also has significant osteoarthritis in her facet joints and we may consider medial branch blocks at L4/5 and L5/S1.  She will need to hold her aspirin and Plavix before the transforaminal injection

## 2022-05-26 NOTE — PROGRESS NOTES
This note was completed with dictation software and grammatical errors may exist.    CC:  Back pain, left leg pain    HPI:  The patient is a 76-year-old woman with a history of hypertension, CAD on Plavix, history of lumbar surgery who presents in referral from Vijaya Cuevas, Neurosurgery for back pain and left leg pain.  She returns in follow-up today for back pain, bilateral hip pain.  She states that over the last couple months she has had severe pain in the lateral hips, bilateral groin and buttock and also pain across the low back.  She had recently seen Norbert Ruelas, Orthopedics who recommended a right hip joint injection under x-ray, this was completed on 05/11/2022.  She actually had 100% relief of her right groin, buttock pain for a week to the point where she was able to walk long distances without pain whatsoever.  Otherwise she states that if she walks for a couple minutes the pain becomes severe in the bilateral hips and groin, even in the low back.  She also feels that she has pain that radiates along her anterior thighs and into her anterior shins.  She denies any weakness in the legs.  She feels that she has similar pain on the right side her hip, groin as she does on the left side.      Pain intervention history:  She has done some physical therapy in [place] where she lives without much relief.  She has been taking gabapentin 100 milligrams twice daily with no major relief.  She is status post left L3/4 and L4/5 transforaminal injection on 12/27/2019 with 50% relief of her back pain but no improvement in her left leg pain.  She is status post right 3/4 and L4/5 transforaminal epidural steroid injection on 01/28/2020 with 75% relief.  Has had GTB injections with no major relief.She is status post right 3/4 and L4/5 transforaminal epidural steroid injection on 01/28/2020 with 75% relief.    She is status post bilateral L4/5 transforaminal epidural steroid injections on 06/22/2021 with 80%  relief.     Spine surgeries:  Lumbar surgery with Dr. Gordon, likely left L4/5 hemilaminectomy    Antineuropathics:  Gabapentin 300 3 times daily unclear benefit  NSAIDs:  Mobic 15  Physical therapy:  Antidepressants:  Muscle relaxers:  Opioids:  Antiplatelets/Anticoagulants:  Plavix, aspirin      ROS:  She reports back pain only.  Balance of review of systems is negative.    Past Medical History:   Diagnosis Date    Anticoagulant long-term use     CAD (coronary artery disease)     cabg x 4 2007; stents    DDD (degenerative disc disease), lumbar     Encounter for blood transfusion     Hypertension     AUDREY on CPAP     PVD (peripheral vascular disease)     Thyroid disease        Past Surgical History:   Procedure Laterality Date    ARTERIOGRAPHY OF AORTIC ROOT N/A 4/8/2022    Procedure: ARTERIOGRAM, AORTIC ROOT;  Surgeon: Miguel Ross III, MD;  Location: [facility] CATH;  Service: Cardiology;  Laterality: N/A;    BACK SURGERY      lumbar laminec    CARDIAC SURGERY  2007    x 4 vessels    CAROTID ENDARTERECTOMY Bilateral     each side done twice    CORONARY ANGIOPLASTY WITH STENT PLACEMENT      CORONARY ARTERY BYPASS GRAFT      CORONARY BYPASS GRAFT ANGIOGRAPHY  4/8/2022    Procedure: Bypass graft study;  Surgeon: Miguel Ross III, MD;  Location: [facility] CATH;  Service: Cardiology;;    HYSTERECTOMY      LEFT HEART CATHETERIZATION Left 4/8/2022    Procedure: CATHETERIZATION, HEART, LEFT;  Surgeon: Miguel Ross III, MD;  Location: [facility] CATH;  Service: Cardiology;  Laterality: Left;    PERIPHERAL ANGIOGRAPHY N/A 3/9/2021    Procedure: ANGIOGRAM, PERIPHERAL;  Surgeon: Miguel Goldsmith MD;  Location: [facility] CATH;  Service: Cardiology;  Laterality: N/A;    REVISION AMPUTATION OF FINGER Left     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      TRANSFORAMINAL EPIDURAL INJECTION OF STEROID Left 12/27/2019    Procedure: Injection,steroid,epidural,transforaminal approach L3/4 and L4/5;  Surgeon: Delmer Genao,  "MD;  Location: [facility] OR;  Service: Pain Management;  Laterality: Left;    TRANSFORAMINAL EPIDURAL INJECTION OF STEROID Right 2020    Procedure: Injection,steroid,epidural,transforaminal approach L3/4 and L4/5;  Surgeon: Delmer Genao MD;  Location: [facility] OR;  Service: Pain Management;  Laterality: Right;    TRANSFORAMINAL EPIDURAL INJECTION OF STEROID Bilateral 2021    Procedure: Injection,steroid,epidural,transforaminal approach L4/5;  Surgeon: Delmer Genao MD;  Location: [facility] OR;  Service: Pain Management;  Laterality: Bilateral;    TRIGGER FINGER RELEASE Left 2018    Procedure: RELEASE-FINGER-TRIGGER-LEFT RING;  Surgeon: Norbert Ruelas MD;  [facility];  Service: Orthopedics;  Laterality: Left;    VASCULAR SURGERY         Social History     Socioeconomic History    Marital status:    Tobacco Use    Smoking status: Former Smoker     Packs/day: 1.50     Years: 40.00     Pack years: 60.00     Types: Cigarettes     Quit date: 2007     Years since quittin.8    Smokeless tobacco: Never Used   Substance and Sexual Activity    Alcohol use: Not Currently     Alcohol/week: 2.0 standard drinks     Types: 2 Glasses of wine per week     Comment: socially    Drug use: No         Medications/Allergies: See med card    Vitals:    22 0848   BP: (!) 90/50   Pulse: 80   Weight: 71.6 kg (157 lb 13.6 oz)   Height: 5' 2.4" (1.585 m)   PainSc:   1   PainLoc: Back         Physical exam:  Gen: A and O x3, pleasant, well-groomed  Skin: No rashes or obvious lesions  HEENT: PERRLA, no obvious deformities on ears or in canals. Trachea midline.  CVS: Regular rate and rhythm, normal palpable pulses.  Resp:No increased work of breathing, symmetrical chest rise.  Abdomen: Soft, NT/ND.  Musculoskeletal:No antalgic gait.     Neuro:  Lower extremities: 5/5 strength bilaterally, except for left hip flexion 4/5  Reflexes: Patellar 0+, Achilles 0+ bilaterally.  Sensory:  Intact and " symmetrical to light touch and pinprick in L2-S1 dermatomes bilaterally.    Lumbar spine:  Lumbar spine:  Range of motion is mildly reduced with flexion and moderately reduced with extension with increased pain on extension, no pain with flexion.    Adair's test causes no increased pain on either side.    Supine straight leg raise is negative bilaterally.    Internal and external rotation of the hip causes no increased pain on either side.  Myofascial exam: No tenderness to palpation across lumbar paraspinous muscles.      Imagin19 MRI L-spine:  T12-L1: There is right facet joint arthropathy with ligamentum flavum thickening contributing to mild right foraminal stenosis.  There is no spinal stenosis.  L1-2: There is bilateral, left greater than right, facet joint arthropathy with ligamentum flavum thickening and a minimal disc bulge.  There is mild left foraminal stenosis without spinal stenosis.  L2-3: There is marked disc space narrowing.  There is a disc bulge with osteophytic ridging and superimposed broad left paracentral disc protrusion with annular fissure.  There is facet joint arthropathy.  There is no spinal stenosis.  There is mild bilateral foraminal stenosis.  L3-4: There is moderate disc space narrowing.  There is a disc bulge with osteophytic ridging but there's a superimposed left paracentral and foraminal disc extrusion in addition to moderate facet joint arthropathy.  There is no significant spinal stenosis but there is severe left lateral recess and foraminal stenosis with mild-to-moderate right foraminal stenosis.  L4-5: There is marked disc space narrowing.  There's been left laminectomy.  There's right greater than left facet joint arthropathy.  There is a diffuse disc bulge with osteophytic ridging and superimposed large left paracentral disc extrusion contributing to moderate spinal stenosis, severe left and moderate right lateral recess stenosis as well as moderate to severe left  and moderate right foraminal stenosis.  L5-S1: There is right greater than left facet joint arthropathy.  There's a mild disc bulge.  There is no significant spinal stenosis.  There is moderate right and mild left foraminal stenosis.    12/11/19 Xray L-spine:  There is a minimal rotary levocurvature of the lower lumbar spine.  There is multilevel degenerative disc and facet disease.  There is no fracture or malalignment and there is no abnormal excursion with flexion or extension.  There's marked disc space narrowing at the L2-3 through L4-5 levels.  There is multilevel facet joint arthropathy, most severe at the L5-S1 level.  There is moderate atherosclerosis.    4/19/22 Xray bilateral hips:  Moderate bilateral femoroacetabular osteoarthritis.    Assessment:   The patient is a 76-year-old woman with a history of hypertension, CAD on Plavix, history of lumbar surgery who presents in referral from Vijaya Cuevas, Neurosurgery for back pain and left leg pain.   1. Spinal stenosis of lumbar region without neurogenic claudication     2. Lumbar spondylosis     3. Chronic hip pain, bilateral         Plan:  1.  We discussed that she has significant pain in the bilateral hips, does have osteoarthritis in both hips noted on x-ray.  On exam, provocative maneuvers do not seem to worsen her hip joint pain much.  However, she had such significant relief with the right hip joint injection, this would seem to be diagnostic.  Nonetheless, it only helped for a week and so the next step would likely need to be hip replacement.  We are going to try a bilateral L4/5 transforaminal injection to see if this helps the back pain but perhaps even the hip and leg pain.  I explained that this could be diagnostic in that if it helps the hips and legs, we may assume that her pain is lumbar radicular in nature.  If it does not help the hips at all, she will follow up with Dr. Norbert Ruelas to discuss further treatment options.  2. If she gets some  relief of the back but only temporarily, she also has significant osteoarthritis in her facet joints and we may consider medial branch blocks at L4/5 and L5/S1.  She will need to hold her aspirin and Plavix before the transforaminal injection

## 2022-05-31 RX ORDER — VALSARTAN 80 MG/1
80 TABLET ORAL DAILY
COMMUNITY
End: 2023-04-14 | Stop reason: ALTCHOICE

## 2022-06-06 ENCOUNTER — HOSPITAL ENCOUNTER (OUTPATIENT)
Dept: RADIOLOGY | Facility: HOSPITAL | Age: 77
Discharge: HOME | End: 2022-06-06
Attending: ANESTHESIOLOGY
Payer: MEDICARE

## 2022-06-06 ENCOUNTER — TELEPHONE (OUTPATIENT)
Dept: PAIN MEDICINE | Facility: CLINIC | Age: 77
End: 2022-06-06
Payer: MEDICARE

## 2022-06-06 ENCOUNTER — HOSPITAL ENCOUNTER (OUTPATIENT)
Facility: HOSPITAL | Age: 77
Discharge: HOME | End: 2022-06-06
Attending: ANESTHESIOLOGY | Admitting: ANESTHESIOLOGY
Payer: MEDICARE

## 2022-06-06 DIAGNOSIS — M54.12 CERVICAL RADICULOPATHY: Primary | ICD-10-CM

## 2022-06-06 DIAGNOSIS — M54.50 LOWER BACK PAIN: ICD-10-CM

## 2022-06-06 DIAGNOSIS — M54.16 LUMBAR RADICULOPATHY: ICD-10-CM

## 2022-06-06 PROCEDURE — 25500020 PHARM REV CODE 255: Mod: PO | Performed by: ANESTHESIOLOGY

## 2022-06-06 PROCEDURE — 64483 PR EPIDURAL INJ, ANES/STEROID, TRANSFORAMINAL, LUMB/SACR, SNGL LEVL: ICD-10-PCS | Mod: 50,,, | Performed by: ANESTHESIOLOGY

## 2022-06-06 PROCEDURE — 72050 X-RAY EXAM NECK SPINE 4/5VWS: CPT | Mod: TC,FY,PO

## 2022-06-06 PROCEDURE — 63600175 PHARM REV CODE 636 W HCPCS: Mod: PO | Performed by: ANESTHESIOLOGY

## 2022-06-06 PROCEDURE — 72050 X-RAY EXAM NECK SPINE 4/5VWS: CPT | Mod: 26,,, | Performed by: RADIOLOGY

## 2022-06-06 PROCEDURE — 72050 XR CERVICAL SPINE COMPLETE 5 VIEW: ICD-10-PCS | Mod: 26,,, | Performed by: RADIOLOGY

## 2022-06-06 PROCEDURE — 25000003 PHARM REV CODE 250: Mod: PO | Performed by: ANESTHESIOLOGY

## 2022-06-06 PROCEDURE — 64483 NJX AA&/STRD TFRM EPI L/S 1: CPT | Mod: 50,,, | Performed by: ANESTHESIOLOGY

## 2022-06-06 PROCEDURE — 64483 NJX AA&/STRD TFRM EPI L/S 1: CPT | Mod: 50,PO | Performed by: ANESTHESIOLOGY

## 2022-06-06 PROCEDURE — 76000 FLUOROSCOPY <1 HR PHYS/QHP: CPT | Mod: TC,PO

## 2022-06-06 RX ORDER — HYDROCODONE BITARTRATE AND ACETAMINOPHEN 5; 325 MG/1; MG/1
1 TABLET ORAL EVERY 8 HOURS PRN
Qty: 15 TABLET | Refills: 0 | Status: SHIPPED | OUTPATIENT
Start: 2022-06-06 | End: 2023-04-14

## 2022-06-06 RX ORDER — BUPIVACAINE HYDROCHLORIDE 2.5 MG/ML
INJECTION, SOLUTION EPIDURAL; INFILTRATION; INTRACAUDAL
Status: DISCONTINUED | OUTPATIENT
Start: 2022-06-06 | End: 2022-06-06 | Stop reason: HOSPADM

## 2022-06-06 RX ORDER — METHYLPREDNISOLONE ACETATE 80 MG/ML
INJECTION, SUSPENSION INTRA-ARTICULAR; INTRALESIONAL; INTRAMUSCULAR; SOFT TISSUE
Status: DISCONTINUED | OUTPATIENT
Start: 2022-06-06 | End: 2022-06-06 | Stop reason: HOSPADM

## 2022-06-06 RX ORDER — LIDOCAINE HYDROCHLORIDE 10 MG/ML
INJECTION, SOLUTION EPIDURAL; INFILTRATION; INTRACAUDAL; PERINEURAL
Status: DISCONTINUED | OUTPATIENT
Start: 2022-06-06 | End: 2022-06-06 | Stop reason: HOSPADM

## 2022-06-06 RX ORDER — ALPRAZOLAM 0.5 MG/1
1 TABLET, ORALLY DISINTEGRATING ORAL ONCE AS NEEDED
Status: COMPLETED | OUTPATIENT
Start: 2022-06-06 | End: 2022-06-06

## 2022-06-06 RX ADMIN — ALPRAZOLAM 1 MG: 0.5 TABLET, ORALLY DISINTEGRATING ORAL at 02:06

## 2022-06-06 NOTE — DISCHARGE SUMMARY
[facility] - Surgery  Discharge Note  Short Stay    Procedure(s) (LRB):  Injection,steroid,epidural,transforaminal approach L4/5 (Bilateral)    OUTCOME: Patient tolerated treatment/procedure well without complication and is now ready for discharge.    DISPOSITION: Home or Self Care    FINAL DIAGNOSIS:  Lumbar radiculopathy    FOLLOWUP: In clinic    DISCHARGE INSTRUCTIONS:    Discharge Procedure Orders   X-Ray Cervical Spine Complete 5 view   Standing Status: Future Standing Exp. Date: 06/07/23

## 2022-06-06 NOTE — TELEPHONE ENCOUNTER
Please let the patient know that I reviewed her cervical x-ray which shows severe degenerative changes in the cervical spine and I suspect that this is causing her neck pain and right arm pain.  Please get her set up with an MRI of the cervical spine and I will call her with the results.

## 2022-06-06 NOTE — OP NOTE
PROCEDURE DATE: 6/6/2022    PROCEDURE: Bilateral L4/5 transforaminal epidural steroid injection under fluoroscopy    DIAGNOSIS: Lumbar  Radiculopathy    Post op diagnosis: Same    PHYSICIAN: Delmer Genao MD    MEDICATIONS INJECTED:  Methylprednisolone 40mg (1ml) and 1ml 0.25% bupivicaine at each nerve root.     LOCAL ANESTHETIC INJECTED:  Lidocaine 1%. 4 ml per site.    SEDATION MEDICATIONS: none    ESTIMATED BLOOD LOSS:  none    COMPLICATIONS:  none    TECHNIQUE:   A time-out was taken to identify patient and procedure side prior to starting the procedure. The patient was placed in a prone position, prepped and draped in the usual sterile fashion using ChloraPrep and sterile towels.  The area to be injected was determined under fluoroscopic guidance in AP and oblique view.  Local anesthetic was given by raising a wheal and going down to the hub of a 25-gauge 1.5 inch needle.  In oblique view, a 3.5 inch 22-gauge bent-tip spinal needle was introduced towards 6 oclock position of the pedicle of each above named nerve root level.  The needle was walked medially then hinged into the neural foramen and position was confirmed in AP and lateral views.  Omnipaque contrast dye was injected to confirm appropriate placement and that there was no vascular uptake.  After negative aspiration for blood or CSF, the medication was then injected. This was performed at the right and then left L4/5 level(s). The patient tolerated the procedure well.    The patient was monitored after the procedure.  Patient was given post procedure and discharge instructions to follow at home. The patient was discharged in a stable condition.

## 2022-06-07 VITALS
RESPIRATION RATE: 18 BRPM | TEMPERATURE: 98 F | WEIGHT: 156 LBS | OXYGEN SATURATION: 98 % | BODY MASS INDEX: 28.71 KG/M2 | DIASTOLIC BLOOD PRESSURE: 88 MMHG | SYSTOLIC BLOOD PRESSURE: 165 MMHG | HEART RATE: 88 BPM | HEIGHT: 62 IN

## 2022-06-07 NOTE — TELEPHONE ENCOUNTER
----- Message from Dana Mccullough sent at 6/7/2022  2:00 PM CDT -----  Contact: Patient  Type: Patient Call Back         Who called: Patient         What is the request in detail: returning missed call regarding test results; please advise             Best call back number: [phone]         Additional Information:            Thank You

## 2022-06-07 NOTE — TELEPHONE ENCOUNTER
----- Message from Linsey Malik sent at 6/7/2022  2:30 PM CDT -----  Contact: Pt  Type:  Patient Returning Call    Who Called:  Pt  Who Left Message for Patient:  Chelo  Does the patient know what this is regarding?:  Yes  Best Call Back Number:  [phone]  Additional Information:  Please call back.  Thanks.

## 2022-06-08 ENCOUNTER — HOSPITAL ENCOUNTER (OUTPATIENT)
Dept: RADIOLOGY | Facility: HOSPITAL | Age: 77
Discharge: HOME | End: 2022-06-08
Attending: ANESTHESIOLOGY
Payer: MEDICARE

## 2022-06-08 DIAGNOSIS — M54.12 CERVICAL RADICULOPATHY: ICD-10-CM

## 2022-06-08 PROCEDURE — 72141 MRI NECK SPINE W/O DYE: CPT | Mod: TC,PO

## 2022-06-08 PROCEDURE — 72141 MRI CERVICAL SPINE WITHOUT CONTRAST: ICD-10-PCS | Mod: 26,,, | Performed by: RADIOLOGY

## 2022-06-08 PROCEDURE — 72141 MRI NECK SPINE W/O DYE: CPT | Mod: 26,,, | Performed by: RADIOLOGY

## 2022-06-10 ENCOUNTER — TELEPHONE (OUTPATIENT)
Dept: PAIN MEDICINE | Facility: CLINIC | Age: 77
End: 2022-06-10
Payer: MEDICARE

## 2022-06-10 DIAGNOSIS — M54.12 CERVICAL RADICULOPATHY: Primary | ICD-10-CM

## 2022-06-10 RX ORDER — SODIUM CHLORIDE, SODIUM LACTATE, POTASSIUM CHLORIDE, CALCIUM CHLORIDE 600; 310; 30; 20 MG/100ML; MG/100ML; MG/100ML; MG/100ML
INJECTION, SOLUTION INTRAVENOUS CONTINUOUS
Status: CANCELLED | OUTPATIENT
Start: 2022-07-01

## 2022-06-10 NOTE — TELEPHONE ENCOUNTER
Please contact the patient and let her know that I reviewed her cervical spine MRI which shows significant narrowing where the nerve exits to the right side at C4/5 but also centrally at several levels and there is a good chance her neck and right shoulder pain are coming from this issue.  Please find out if she is still having a great deal of pain and my recommendation would be to get her set up with physical therapy and also set up a cervical ANAMARIA in the next several weeks.  I want to make sure it has been 2-3 weeks since her lumbar injection which she just had.

## 2022-06-10 NOTE — TELEPHONE ENCOUNTER
"Call placed to Pt per Dr. Genao " I reviewed her cervical spine MRI which shows significant narrowing where the nerve exits to the right side at C4/5 but also centrally at several levels and there is a good chance her neck and right shoulder pain are coming from this issue.  Please find out if she is still having a great deal of pain and my recommendation would be to get her set up with physical therapy and also set up a cervical ANAMARIA in the next several weeks.  I want to make sure it has been 2-3 weeks since her lumbar injection which she just had."     Cervical ANAMARIA  Scheduled for 07-01-22 with Dr. Genao. F/u  appt scheduled for 08-01-22. Pt takes ASA and Plaviz and will need to hold it x7days. Meds ordered by  Dr. Miguel Groves, Cardiologist at [facility].     Pt understands that she should not begin to hold meds until she is notified that the clearance has been received.  "

## 2022-06-10 NOTE — TELEPHONE ENCOUNTER
Cervical ANAMARIA  Scheduled for 07-01-22 with Dr. Genao. F/u  appt scheduled for 08-01-22. Pt takes ASA and Plaviz and will need to hold it x7days. Meds ordered by  Dr. Miguel Groves, Cardiologist at [facility].

## 2022-06-10 NOTE — TELEPHONE ENCOUNTER
"Call placed to Pt per Dr. Genao " I reviewed her cervical spine MRI which shows significant narrowing where the nerve exits to the right side at C4/5 but also centrally at several levels and there is a good chance her neck and right shoulder pain are coming from this issue.  Please find out if she is still having a great deal of pain and my recommendation would be to get her set up with physical therapy and also set up a cervical ANAMARIA in the next several weeks.  I want to make sure it has been 2-3 weeks since her lumbar injection which she just had."      No answer. V/M left.  "

## 2022-06-15 ENCOUNTER — TELEPHONE (OUTPATIENT)
Dept: PAIN MEDICINE | Facility: CLINIC | Age: 77
End: 2022-06-15
Payer: MEDICARE

## 2022-06-15 NOTE — TELEPHONE ENCOUNTER
Call placed to Pt to advise it is ok for her to hold ASA and Plavix x 7 days prior to procedure. Pt verbalized understanding.

## 2022-07-01 ENCOUNTER — HOSPITAL ENCOUNTER (OUTPATIENT)
Dept: RADIOLOGY | Facility: HOSPITAL | Age: 77
Discharge: HOME | End: 2022-07-01
Attending: ANESTHESIOLOGY
Payer: MEDICARE

## 2022-07-01 ENCOUNTER — HOSPITAL ENCOUNTER (OUTPATIENT)
Facility: HOSPITAL | Age: 77
Discharge: HOME | End: 2022-07-01
Attending: ANESTHESIOLOGY | Admitting: ANESTHESIOLOGY
Payer: MEDICARE

## 2022-07-01 VITALS
HEIGHT: 62 IN | OXYGEN SATURATION: 95 % | DIASTOLIC BLOOD PRESSURE: 73 MMHG | RESPIRATION RATE: 12 BRPM | BODY MASS INDEX: 28.71 KG/M2 | TEMPERATURE: 98 F | SYSTOLIC BLOOD PRESSURE: 176 MMHG | WEIGHT: 156 LBS | HEART RATE: 76 BPM

## 2022-07-01 DIAGNOSIS — M54.12 CERVICAL RADICULOPATHY: ICD-10-CM

## 2022-07-01 DIAGNOSIS — M54.2 NECK PAIN: ICD-10-CM

## 2022-07-01 PROCEDURE — 63600175 PHARM REV CODE 636 W HCPCS: Mod: PO | Performed by: ANESTHESIOLOGY

## 2022-07-01 PROCEDURE — 25500020 PHARM REV CODE 255: Mod: PO | Performed by: ANESTHESIOLOGY

## 2022-07-01 PROCEDURE — 62321 PR INJ CERV/THORAC, W/GUIDANCE: ICD-10-PCS | Mod: ,,, | Performed by: ANESTHESIOLOGY

## 2022-07-01 PROCEDURE — 25000003 PHARM REV CODE 250: Mod: PO | Performed by: ANESTHESIOLOGY

## 2022-07-01 PROCEDURE — 62321 NJX INTERLAMINAR CRV/THRC: CPT | Mod: ,,, | Performed by: ANESTHESIOLOGY

## 2022-07-01 PROCEDURE — 62321 NJX INTERLAMINAR CRV/THRC: CPT | Mod: PO | Performed by: ANESTHESIOLOGY

## 2022-07-01 PROCEDURE — A4216 STERILE WATER/SALINE, 10 ML: HCPCS | Mod: PO | Performed by: ANESTHESIOLOGY

## 2022-07-01 PROCEDURE — 76000 FLUOROSCOPY <1 HR PHYS/QHP: CPT | Mod: TC,PO

## 2022-07-01 RX ORDER — SODIUM CHLORIDE 9 MG/ML
INJECTION, SOLUTION INTRAMUSCULAR; INTRAVENOUS; SUBCUTANEOUS
Status: DISCONTINUED | OUTPATIENT
Start: 2022-07-01 | End: 2022-07-01 | Stop reason: HOSPADM

## 2022-07-01 RX ORDER — MIDAZOLAM HYDROCHLORIDE 2 MG/2ML
INJECTION, SOLUTION INTRAMUSCULAR; INTRAVENOUS
Status: DISCONTINUED | OUTPATIENT
Start: 2022-07-01 | End: 2022-07-01 | Stop reason: HOSPADM

## 2022-07-01 RX ORDER — SODIUM CHLORIDE, SODIUM LACTATE, POTASSIUM CHLORIDE, CALCIUM CHLORIDE 600; 310; 30; 20 MG/100ML; MG/100ML; MG/100ML; MG/100ML
INJECTION, SOLUTION INTRAVENOUS CONTINUOUS
Status: DISCONTINUED | OUTPATIENT
Start: 2022-07-01 | End: 2022-07-01 | Stop reason: HOSPADM

## 2022-07-01 RX ORDER — METHYLPREDNISOLONE ACETATE 80 MG/ML
INJECTION, SUSPENSION INTRA-ARTICULAR; INTRALESIONAL; INTRAMUSCULAR; SOFT TISSUE
Status: DISCONTINUED | OUTPATIENT
Start: 2022-07-01 | End: 2022-07-01 | Stop reason: HOSPADM

## 2022-07-01 RX ORDER — LIDOCAINE HYDROCHLORIDE 10 MG/ML
INJECTION, SOLUTION EPIDURAL; INFILTRATION; INTRACAUDAL; PERINEURAL
Status: DISCONTINUED | OUTPATIENT
Start: 2022-07-01 | End: 2022-07-01 | Stop reason: HOSPADM

## 2022-07-01 RX ADMIN — SODIUM CHLORIDE, SODIUM LACTATE, POTASSIUM CHLORIDE, AND CALCIUM CHLORIDE: .6; .31; .03; .02 INJECTION, SOLUTION INTRAVENOUS at 01:07

## 2022-07-01 NOTE — OP NOTE
PROCEDURE DATE: 7/1/2022    Procedure: C7-T1 cervical interlaminar epidural steroid injection under utilizing fluoroscopy.    Diagnosis: Cervical Radiculopathy    POSTOP DIAGNOSIS: SAME    Physician: Delmer Genao MD    Medications injected:  Methylprednisone 80mg followed by a slow injection of 4 mL sterile, preservative-free normal saline.    Local anesthetic used: Lidocaine 1%, 4 ml.    Sedation Medications: 2mg versed    Complications:  none    Estimated blood loss: none    Technique:  A time-out was taken to identify patient and procedure prior to starting the procedure.  With the patient laying in a prone position with the neck in a mid-flexed forward position, the area was prepped and draped in the usual sterile fashion using ChloraPrep and a fenestrated drape.  The area was determined under AP fluoroscopic guidance.  Local anesthetic was given using a 25-gauge 1.5 inch needle by raising a wheal and then infiltrating ventrally.  A 3.5 inch 20-gauge Touhy needle was introduced under fluoroscopic guidance to meet the lamina of C7.  The needle was then hinged under the lamina then advanced using loss of resistance technique.  Once the tip of the needle was in the desired position, the contrast dye Omnipaque was injected to determine placement and no uptake.  The steroid was then injected slowly followed by a slow injection of 4 mL of the sterile preservative-free normal saline.  The patient tolerated the procedure well.    The patient was monitored after the procedure and was given post-procedure and discharge instructions to follow at home. The patient was discharged in a stable condition.

## 2022-07-01 NOTE — PLAN OF CARE
Pt meets criteria for discharge. Vital signs stable. Pt without falls. Discharge teaching complete. Questions answered.      98.7

## 2022-07-01 NOTE — DISCHARGE SUMMARY
[facility] - Surgery  Discharge Note  Short Stay    Procedure(s) (LRB):  Injection-steroid-epidural-cervical (N/A)    OUTCOME: Patient tolerated treatment/procedure well without complication and is now ready for discharge.    DISPOSITION: Home or Self Care    FINAL DIAGNOSIS:  Cervical radiculopathy    FOLLOWUP: In clinic    DISCHARGE INSTRUCTIONS:    Discharge Procedure Orders   Diet Adult Regular     No dressing needed     Notify your health care provider if you experience any of the following:  temperature >100.4     Activity as tolerated

## 2022-07-01 NOTE — H&P
CC: Neck pain    HPI: The patient is a 75yo woman with a history of cervical radiculopathy here for cervical ANAMARIA. There are no major changes in history and physical from 5/26/22.    Past Medical History:   Diagnosis Date    Anticoagulant long-term use     CAD (coronary artery disease)     cabg x 4 2007; stents    DDD (degenerative disc disease), lumbar     Encounter for blood transfusion     Hypertension     AUDREY on CPAP     PVD (peripheral vascular disease)     Thyroid disease        Past Surgical History:   Procedure Laterality Date    ARTERIOGRAPHY OF AORTIC ROOT N/A 4/8/2022    Procedure: ARTERIOGRAM, AORTIC ROOT;  Surgeon: Miguel Ross III, MD;  Location: [facility] CATH;  Service: Cardiology;  Laterality: N/A;    BACK SURGERY      lumbar laminec    CARDIAC SURGERY  2007    x 4 vessels    CAROTID ENDARTERECTOMY Bilateral     each side done twice    CORONARY ANGIOPLASTY WITH STENT PLACEMENT      CORONARY ARTERY BYPASS GRAFT      CORONARY BYPASS GRAFT ANGIOGRAPHY  4/8/2022    Procedure: Bypass graft study;  Surgeon: Miguel Ross III, MD;  Location: [facility];  Service: Cardiology;;    HYSTERECTOMY      LEFT HEART CATHETERIZATION Left 4/8/2022    Procedure: CATHETERIZATION, HEART, LEFT;  Surgeon: Miguel Ross III, MD;  Location: [facility];  Service: Cardiology;  Laterality: Left;    PERIPHERAL ANGIOGRAPHY N/A 3/9/2021    Procedure: ANGIOGRAM, PERIPHERAL;  Surgeon: Miguel Goldsmith MD;  Location: [facility]ATH;  Service: Cardiology;  Laterality: N/A;    REVISION AMPUTATION OF FINGER Left     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      TRANSFORAMINAL EPIDURAL INJECTION OF STEROID Left 12/27/2019    Procedure: Injection,steroid,epidural,transforaminal approach L3/4 and L4/5;  Surgeon: Delmer Genao MD;  Location: [facility] OR;  Service: Pain Management;  Laterality: Left;    TRANSFORAMINAL EPIDURAL INJECTION OF STEROID Right 1/28/2020    Procedure: Injection,steroid,epidural,transforaminal  approach L3/4 and L4/5;  Surgeon: Delmer Genao MD;  Location: [facility] OR;  Service: Pain Management;  Laterality: Right;    TRANSFORAMINAL EPIDURAL INJECTION OF STEROID Bilateral 2021    Procedure: Injection,steroid,epidural,transforaminal approach L4/5;  Surgeon: Delmer Genao MD;  Location: [facility] OR;  Service: Pain Management;  Laterality: Bilateral;    TRANSFORAMINAL EPIDURAL INJECTION OF STEROID Bilateral 2022    Procedure: Injection,steroid,epidural,transforaminal approach L4/5;  Surgeon: Delmer Genao MD;  Location: [facility] OR;  Service: Pain Management;  Laterality: Bilateral;    TRIGGER FINGER RELEASE Left 2018    Procedure: RELEASE-FINGER-TRIGGER-LEFT RING;  Surgeon: Norbert Ruelas MD;  Location: [facility] OR;  Service: Orthopedics;  Laterality: Left;    VASCULAR SURGERY         Family History   Problem Relation Age of Onset    Hypertension Mother     Arthritis Mother     Cancer Father         esophageal    No Known Problems Sister     Heart disease Brother     Cancer Brother     Heart disease Brother     Cancer Brother        Social History     Socioeconomic History    Marital status:    Tobacco Use    Smoking status: Former Smoker     Packs/day: 1.50     Years: 40.00     Pack years: 60.00     Types: Cigarettes     Quit date: 2007     Years since quittin.9    Smokeless tobacco: Never Used   Substance and Sexual Activity    Alcohol use: Not Currently     Alcohol/week: 2.0 standard drinks     Types: 2 Glasses of wine per week     Comment: socially    Drug use: No       No current facility-administered medications for this encounter.     Facility-Administered Medications Ordered in Other Encounters   Medication Dose Route Frequency Provider Last Rate Last Admin    diphenhydrAMINE injection 25 mg  25 mg Intravenous On Call Procedure Miguel Ross III, MD        diphenhydrAMINE injection 50 mg  50 mg Intravenous Once Miguel Ross III, MD         "LIDOcaine (PF) 10 mg/ml (1%) injection 10 mg  1 mL Other Once Miguel Ross III, MD        lorazepam injection 1 mg  1 mg Intravenous Once Miguel Ross III, MD        lorazepam injection 1 mg  1 mg Intravenous On Call Procedure Miguel Ross III, MD   1 mg at 04/08/22 0747       Review of patient's allergies indicates:  No Known Allergies    Vitals:    06/29/22 1120 07/01/22 1320   BP:  (!) 141/68   Pulse:  68   Resp:  17   Temp:  98.1 °F (36.7 °C)   TempSrc:  Skin   SpO2:  97%   Weight: 70.8 kg (156 lb)    Height: 5' 2" (1.575 m)        ASA 2, Mallampati 2    REVIEW OF SYSTEMS:     GENERAL: No weight loss, malaise or fevers.  HEENT:  No recent changes in vision or hearing  NECK: Negative for lumps, no difficulty with swallowing.  RESPIRATORY: Negative for cough, wheezing or shortness of breath, patient denies any recent URI.  CARDIOVASCULAR: Negative for chest pain, leg swelling or palpitations.  GI: Negative for abdominal discomfort, blood in stools or black stools or change in bowel habits.  MUSCULOSKELETAL: See HPI.  SKIN: Negative for lesions, rash, and itching.  PSYCH: No suicidal or homicidal ideations, no current mood disturbances.  HEMATOLOGY/LYMPHOLOGY: Negative for prolonged bleeding, bruising easily or swollen nodes. Patient is not currently taking any anti-coagulants  ENDO: No history of diabetes or thyroid dysfunction  NEURO: No history of syncope, paralysis, seizures or tremors.All other reviewed and negative other than HPI.    Physical exam:  Gen: A and O x3, pleasant, well-groomed  Skin: No rashes or obvious lesions  HEENT: PERRLA, no obvious deformities on ears or in canals. No thyroid masses, trachea midline, no palpable lymph nodes in neck, axilla.  CVS: Regular rate and rhythm, normal S1 and S2, no murmurs.  Resp: Clear to auscultation bilaterally.  Abdomen: Soft, NT/ND, normal bowel sounds present.  Musculoskeletal/Neuro: Moving all extremities    Assessment:  Cervical radiculopathy  -     " Case Request Operating Room: Injection-steroid-epidural-cervical  -     Vital signs; Standing  -     Place 18-22 gauage peripheral IV ; Standing  -     Verify informed consent; Standing  -     Notify physician ; Standing  -     Notify physician ; Standing  -     Notify physician (specify); Standing  -     Diet NPO; Standing  -     Place in Outpatient; Standing  -     lactated ringers infusion    Other orders  -     IP VTE LOW RISK PATIENT; Standing

## 2022-08-01 ENCOUNTER — OFFICE VISIT (OUTPATIENT)
Dept: PAIN MEDICINE | Facility: CLINIC | Age: 77
End: 2022-08-01
Payer: MEDICARE

## 2022-08-01 VITALS
BODY MASS INDEX: 28.29 KG/M2 | HEIGHT: 62 IN | WEIGHT: 153.75 LBS | HEART RATE: 81 BPM | DIASTOLIC BLOOD PRESSURE: 59 MMHG | SYSTOLIC BLOOD PRESSURE: 130 MMHG | OXYGEN SATURATION: 95 %

## 2022-08-01 DIAGNOSIS — M48.061 SPINAL STENOSIS OF LUMBAR REGION WITHOUT NEUROGENIC CLAUDICATION: ICD-10-CM

## 2022-08-01 DIAGNOSIS — M54.16 LUMBAR RADICULOPATHY: Primary | ICD-10-CM

## 2022-08-01 DIAGNOSIS — M50.30 DDD (DEGENERATIVE DISC DISEASE), CERVICAL: ICD-10-CM

## 2022-08-01 DIAGNOSIS — M54.12 CERVICAL RADICULOPATHY: ICD-10-CM

## 2022-08-01 PROCEDURE — 1101F PT FALLS ASSESS-DOCD LE1/YR: CPT | Mod: CPTII,S$GLB,, | Performed by: PHYSICIAN ASSISTANT

## 2022-08-01 PROCEDURE — 3075F SYST BP GE 130 - 139MM HG: CPT | Mod: CPTII,S$GLB,, | Performed by: PHYSICIAN ASSISTANT

## 2022-08-01 PROCEDURE — 99212 PR OFFICE/OUTPT VISIT, EST, LEVL II, 10-19 MIN: ICD-10-PCS | Mod: S$GLB,,, | Performed by: PHYSICIAN ASSISTANT

## 2022-08-01 PROCEDURE — 4010F ACE/ARB THERAPY RXD/TAKEN: CPT | Mod: CPTII,S$GLB,, | Performed by: PHYSICIAN ASSISTANT

## 2022-08-01 PROCEDURE — 3078F PR MOST RECENT DIASTOLIC BLOOD PRESSURE < 80 MM HG: ICD-10-PCS | Mod: CPTII,S$GLB,, | Performed by: PHYSICIAN ASSISTANT

## 2022-08-01 PROCEDURE — 1159F MED LIST DOCD IN RCRD: CPT | Mod: CPTII,S$GLB,, | Performed by: PHYSICIAN ASSISTANT

## 2022-08-01 PROCEDURE — 1126F PR PAIN SEVERITY QUANTIFIED, NO PAIN PRESENT: ICD-10-PCS | Mod: CPTII,S$GLB,, | Performed by: PHYSICIAN ASSISTANT

## 2022-08-01 PROCEDURE — 1160F PR REVIEW ALL MEDS BY PRESCRIBER/CLIN PHARMACIST DOCUMENTED: ICD-10-PCS | Mod: CPTII,S$GLB,, | Performed by: PHYSICIAN ASSISTANT

## 2022-08-01 PROCEDURE — 4010F PR ACE/ARB THEARPY RXD/TAKEN: ICD-10-PCS | Mod: CPTII,S$GLB,, | Performed by: PHYSICIAN ASSISTANT

## 2022-08-01 PROCEDURE — 3288F PR FALLS RISK ASSESSMENT DOCUMENTED: ICD-10-PCS | Mod: CPTII,S$GLB,, | Performed by: PHYSICIAN ASSISTANT

## 2022-08-01 PROCEDURE — 1159F PR MEDICATION LIST DOCUMENTED IN MEDICAL RECORD: ICD-10-PCS | Mod: CPTII,S$GLB,, | Performed by: PHYSICIAN ASSISTANT

## 2022-08-01 PROCEDURE — 3288F FALL RISK ASSESSMENT DOCD: CPT | Mod: CPTII,S$GLB,, | Performed by: PHYSICIAN ASSISTANT

## 2022-08-01 PROCEDURE — 3008F PR BODY MASS INDEX (BMI) DOCUMENTED: ICD-10-PCS | Mod: CPTII,S$GLB,, | Performed by: PHYSICIAN ASSISTANT

## 2022-08-01 PROCEDURE — 3008F BODY MASS INDEX DOCD: CPT | Mod: CPTII,S$GLB,, | Performed by: PHYSICIAN ASSISTANT

## 2022-08-01 PROCEDURE — 3075F PR MOST RECENT SYSTOLIC BLOOD PRESS GE 130-139MM HG: ICD-10-PCS | Mod: CPTII,S$GLB,, | Performed by: PHYSICIAN ASSISTANT

## 2022-08-01 PROCEDURE — 99999 PR PBB SHADOW E&M-EST. PATIENT-LVL IV: CPT | Mod: PBBFAC,,, | Performed by: PHYSICIAN ASSISTANT

## 2022-08-01 PROCEDURE — 1101F PR PT FALLS ASSESS DOC 0-1 FALLS W/OUT INJ PAST YR: ICD-10-PCS | Mod: CPTII,S$GLB,, | Performed by: PHYSICIAN ASSISTANT

## 2022-08-01 PROCEDURE — 3078F DIAST BP <80 MM HG: CPT | Mod: CPTII,S$GLB,, | Performed by: PHYSICIAN ASSISTANT

## 2022-08-01 PROCEDURE — 1126F AMNT PAIN NOTED NONE PRSNT: CPT | Mod: CPTII,S$GLB,, | Performed by: PHYSICIAN ASSISTANT

## 2022-08-01 PROCEDURE — 1160F RVW MEDS BY RX/DR IN RCRD: CPT | Mod: CPTII,S$GLB,, | Performed by: PHYSICIAN ASSISTANT

## 2022-08-01 PROCEDURE — 99212 OFFICE O/P EST SF 10 MIN: CPT | Mod: S$GLB,,, | Performed by: PHYSICIAN ASSISTANT

## 2022-08-01 PROCEDURE — 99999 PR PBB SHADOW E&M-EST. PATIENT-LVL IV: ICD-10-PCS | Mod: PBBFAC,,, | Performed by: PHYSICIAN ASSISTANT

## 2022-08-01 NOTE — PROGRESS NOTES
This note was completed with dictation software and grammatical errors may exist.    CC:  Back pain, left leg pain    HPI:  The patient is a 76-year-old woman with a history of hypertension, CAD on Plavix, history of lumbar surgery who presents in referral from [facility] Neurosurgery for back pain and left leg pain.  She is status post bilateral L4/5 transforaminal epidural steroid injections on 06/06/2022 with 100% relief of her back and leg pain.  She is status post C7-T1 interlaminar epidural steroid injection on 07/01/2022 with 100% relief of her neck pain.  She does report some remaining right upper extremity weakness and pins and needles but no pain.  She does report some pain in her lateral hips but this is tolerable.  She denies having numbness or incontinence.    Pain intervention history:  She has done some physical therapy in [place] where she lives without much relief.  She has been taking gabapentin 100 milligrams twice daily with no major relief.  She is status post left L3/4 and L4/5 transforaminal injection on 12/27/2019 with 50% relief of her back pain but no improvement in her left leg pain.  She is status post right 3/4 and L4/5 transforaminal epidural steroid injection on 01/28/2020 with 75% relief.  Has had GTB injections with no major relief.She is status post right 3/4 and L4/5 transforaminal epidural steroid injection on 01/28/2020 with 75% relief.    She is status post bilateral L4/5 transforaminal epidural steroid injections on 06/22/2021 with 80% relief.   She is status post bilateral L4/5 transforaminal epidural steroid injections on 06/06/2022 with 100% relief of her back and leg pain.  She is status post C7-T1 interlaminar epidural steroid injection on 07/01/2022 with 100% relief of her neck pain.     Spine surgeries:  Lumbar surgery with Dr. Gordon, likely left L4/5 hemilaminectomy    Antineuropathics:  Gabapentin 300 3 times daily unclear benefit  NSAIDs:  Mobic 15  Physical  therapy:  Antidepressants:  Muscle relaxers:  Opioids:  Antiplatelets/Anticoagulants:  Plavix, aspirin      ROS:  She reports back pain only.  Balance of review of systems is negative.    Past Medical History:   Diagnosis Date    Anticoagulant long-term use     CAD (coronary artery disease)     cabg x 4 2007; stents    DDD (degenerative disc disease), lumbar     Encounter for blood transfusion     Hypertension     AUDREY on CPAP     PVD (peripheral vascular disease)     Thyroid disease        Past Surgical History:   Procedure Laterality Date    ARTERIOGRAPHY OF AORTIC ROOT N/A 4/8/2022    Procedure: ARTERIOGRAM, AORTIC ROOT;  Surgeon: Miguel Ross III, MD;  Location: [facility] CATH;  Service: Cardiology;  Laterality: N/A;    BACK SURGERY      lumbar laminec    CARDIAC SURGERY  2007    x 4 vessels    CAROTID ENDARTERECTOMY Bilateral     each side done twice    CORONARY ANGIOPLASTY WITH STENT PLACEMENT      CORONARY ARTERY BYPASS GRAFT      CORONARY BYPASS GRAFT ANGIOGRAPHY  4/8/2022    Procedure: Bypass graft study;  Surgeon: Miguel Ross III, MD;  Location: [facility]ATH;  Service: Cardiology;;    EPIDURAL STEROID INJECTION INTO CERVICAL SPINE N/A 7/1/2022    Procedure: Injection-steroid-epidural-cervical;  Surgeon: Delmer Genao MD;  Location: [facility] OR;  Service: Pain Management;  Laterality: N/A;    HYSTERECTOMY      LEFT HEART CATHETERIZATION Left 4/8/2022    Procedure: CATHETERIZATION, HEART, LEFT;  Surgeon: Miguel Ross III, MD;  Location: [facility];  Service: Cardiology;  Laterality: Left;    PERIPHERAL ANGIOGRAPHY N/A 3/9/2021    Procedure: ANGIOGRAM, PERIPHERAL;  Surgeon: Miguel Goldsmith MD;  [facility];  Service: Cardiology;  Laterality: N/A;    REVISION AMPUTATION OF FINGER Left     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      TRANSFORAMINAL EPIDURAL INJECTION OF STEROID Left 12/27/2019    Procedure: Injection,steroid,epidural,transforaminal approach L3/4 and L4/5;   "Surgeon: Delmer Genao MD;  Location: [facility] OR;  Service: Pain Management;  Laterality: Left;    TRANSFORAMINAL EPIDURAL INJECTION OF STEROID Right 1/28/2020    Procedure: Injection,steroid,epidural,transforaminal approach L3/4 and L4/5;  Surgeon: Delmer Genao MD;  Location: [facility] OR;  Service: Pain Management;  Laterality: Right;    TRANSFORAMINAL EPIDURAL INJECTION OF STEROID Bilateral 6/22/2021    Procedure: Injection,steroid,epidural,transforaminal approach L4/5;  Surgeon: Delmer Genao MD;  Location: [facility] OR;  Service: Pain Management;  Laterality: Bilateral;    TRANSFORAMINAL EPIDURAL INJECTION OF STEROID Bilateral 6/6/2022    Procedure: Injection,steroid,epidural,transforaminal approach L4/5;  Surgeon: Delmer Genao MD;  Location: [facility] OR;  Service: Pain Management;  Laterality: Bilateral;    TRIGGER FINGER RELEASE Left 6/21/2018    Procedure: RELEASE-FINGER-TRIGGER-LEFT RING;  Surgeon: Norbert Ruelas MD;  Location: [facility] OR;  Service: Orthopedics;  Laterality: Left;    VASCULAR SURGERY         Social History     Socioeconomic History    Marital status:    Tobacco Use    Smoking status: Former Smoker     Packs/day: 1.50     Years: 40.00     Pack years: 60.00     Types: Cigarettes     Quit date: 7/7/2007     Years since quitting: 15.0    Smokeless tobacco: Never Used   Substance and Sexual Activity    Alcohol use: Not Currently     Alcohol/week: 2.0 standard drinks     Types: 2 Glasses of wine per week     Comment: socially    Drug use: No         Medications/Allergies: See med card    Vitals:    08/01/22 1435   BP: (!) 130/59   Pulse: 81   SpO2: 95%   Weight: 69.7 kg (153 lb 12.3 oz)   Height: 5' 2" (1.575 m)   PainSc: 0-No pain         Physical exam:  Gen: A and O x3, pleasant, well-groomed  Skin: No rashes or obvious lesions  HEENT: PERRLA, no obvious deformities on ears or in canals.Trachea midline.  CVS: Regular rate and rhythm, normal palpable pulses.  Resp: Clear " to auscultation bilaterally, no wheezes or rales.  Abdomen: Soft, NT/ND.  Musculoskeletal: Able to heel walk, toe walk. No antalgic gait.     Neuro:  Motor:    Right Left   C4 Shoulder Abduction  5  5   C5 Elbow Flexion    5  5   C6 Wrist Extension  5  5   C7 Elbow Extension   5  5   C8/T1 Hand Intrinsics   5  5   C8 First Dorsal Interosseus  5  5   C8 Abductor Pollicus Brevis  5  5       Iliopsoas Quadriceps Knee  Flexion Tibialis  anterior Gastro- cnemius EHL   Lower: R  5 5 5 5    L  5 5        Left  Right    Triceps DTR 2+ 2+   Biceps DTR 2+ 2+   Brachioradialis DTR 2+ 2+   Patellar DTR 2+ 2+   Achilles DTR 2+ 2+   Roberts Absent  Absent   Clonus Absent Absent   Babinski Absent Absent     Sensory: Intact and symmetrical to light touch and pinprick in C2-T1 dermatomes bilaterally. Intact and symmetrical to light touch and pinprick in L1-S1 dermatomes bilaterally.    Cervical spine: ROM is full in flexion, extension and lateral rotation without increased pain.  Spurling's maneuver causes no neck pain to either side.  Myofascial exam: No Tenderness to palpation across cervical paraspinous region bilaterally.    Lumbar spine:  Lumbar spine: ROM is full with flexion extension and oblique extension with no increased pain.    Adair's test causes no increased pain on either side.    Supine straight leg raise is negative bilaterally.    Internal and external rotation of the hip causes no increased pain on either side.  Myofascial exam: No tenderness to palpation across lumbar paraspinous muscles.      Imagin19 MRI L-spine:  T12-L1: There is right facet joint arthropathy with ligamentum flavum thickening contributing to mild right foraminal stenosis.  There is no spinal stenosis.  L1-2: There is bilateral, left greater than right, facet joint arthropathy with ligamentum flavum thickening and a minimal disc bulge.  There is mild left foraminal stenosis without spinal  stenosis.  L2-3: There is marked disc space narrowing.  There is a disc bulge with osteophytic ridging and superimposed broad left paracentral disc protrusion with annular fissure.  There is facet joint arthropathy.  There is no spinal stenosis.  There is mild bilateral foraminal stenosis.  L3-4: There is moderate disc space narrowing.  There is a disc bulge with osteophytic ridging but there's a superimposed left paracentral and foraminal disc extrusion in addition to moderate facet joint arthropathy.  There is no significant spinal stenosis but there is severe left lateral recess and foraminal stenosis with mild-to-moderate right foraminal stenosis.  L4-5: There is marked disc space narrowing.  There's been left laminectomy.  There's right greater than left facet joint arthropathy.  There is a diffuse disc bulge with osteophytic ridging and superimposed large left paracentral disc extrusion contributing to moderate spinal stenosis, severe left and moderate right lateral recess stenosis as well as moderate to severe left and moderate right foraminal stenosis.  L5-S1: There is right greater than left facet joint arthropathy.  There's a mild disc bulge.  There is no significant spinal stenosis.  There is moderate right and mild left foraminal stenosis.    12/11/19 Xray L-spine:  There is a minimal rotary levocurvature of the lower lumbar spine.  There is multilevel degenerative disc and facet disease.  There is no fracture or malalignment and there is no abnormal excursion with flexion or extension.  There's marked disc space narrowing at the L2-3 through L4-5 levels.  There is multilevel facet joint arthropathy, most severe at the L5-S1 level.  There is moderate atherosclerosis.    4/19/22 Xray bilateral hips:  Moderate bilateral femoroacetabular osteoarthritis.    Assessment:   The patient is a 76-year-old woman with a history of hypertension, CAD on Plavix, history of lumbar surgery who presents in referral from  [facility] for back pain and left leg pain.   1. Lumbar radiculopathy     2. Spinal stenosis of lumbar region without neurogenic claudication     3. Cervical radiculopathy     4. DDD (degenerative disc disease), cervical         Plan:  1. She had complete relief following both the bilateral L4/5 transforaminal epidural steroid injections and cervical epidural steroid injection.  We discussed the importance of home exercises.  She is not interested in returning to physical therapy at this time.  2. Follow-up on an as-needed basis.

## 2023-04-14 ENCOUNTER — OFFICE VISIT (OUTPATIENT)
Dept: PAIN MEDICINE | Facility: CLINIC | Age: 78
End: 2023-04-14
Payer: MEDICARE

## 2023-04-14 VITALS
WEIGHT: 152.75 LBS | SYSTOLIC BLOOD PRESSURE: 110 MMHG | HEART RATE: 78 BPM | BODY MASS INDEX: 28.11 KG/M2 | DIASTOLIC BLOOD PRESSURE: 54 MMHG | HEIGHT: 62 IN | TEMPERATURE: 99 F

## 2023-04-14 DIAGNOSIS — M48.061 SPINAL STENOSIS OF LUMBAR REGION WITHOUT NEUROGENIC CLAUDICATION: Primary | ICD-10-CM

## 2023-04-14 DIAGNOSIS — M54.16 LUMBAR RADICULOPATHY: ICD-10-CM

## 2023-04-14 PROCEDURE — 99999 PR PBB SHADOW E&M-EST. PATIENT-LVL V: ICD-10-PCS | Mod: PBBFAC,,, | Performed by: ANESTHESIOLOGY

## 2023-04-14 PROCEDURE — 1101F PT FALLS ASSESS-DOCD LE1/YR: CPT | Mod: CPTII,S$GLB,, | Performed by: ANESTHESIOLOGY

## 2023-04-14 PROCEDURE — 1101F PR PT FALLS ASSESS DOC 0-1 FALLS W/OUT INJ PAST YR: ICD-10-PCS | Mod: CPTII,S$GLB,, | Performed by: ANESTHESIOLOGY

## 2023-04-14 PROCEDURE — 3288F FALL RISK ASSESSMENT DOCD: CPT | Mod: CPTII,S$GLB,, | Performed by: ANESTHESIOLOGY

## 2023-04-14 PROCEDURE — 1125F AMNT PAIN NOTED PAIN PRSNT: CPT | Mod: CPTII,S$GLB,, | Performed by: ANESTHESIOLOGY

## 2023-04-14 PROCEDURE — 1159F MED LIST DOCD IN RCRD: CPT | Mod: CPTII,S$GLB,, | Performed by: ANESTHESIOLOGY

## 2023-04-14 PROCEDURE — 1125F PR PAIN SEVERITY QUANTIFIED, PAIN PRESENT: ICD-10-PCS | Mod: CPTII,S$GLB,, | Performed by: ANESTHESIOLOGY

## 2023-04-14 PROCEDURE — 3288F PR FALLS RISK ASSESSMENT DOCUMENTED: ICD-10-PCS | Mod: CPTII,S$GLB,, | Performed by: ANESTHESIOLOGY

## 2023-04-14 PROCEDURE — 3078F DIAST BP <80 MM HG: CPT | Mod: CPTII,S$GLB,, | Performed by: ANESTHESIOLOGY

## 2023-04-14 PROCEDURE — 99999 PR PBB SHADOW E&M-EST. PATIENT-LVL V: CPT | Mod: PBBFAC,,, | Performed by: ANESTHESIOLOGY

## 2023-04-14 PROCEDURE — 3074F PR MOST RECENT SYSTOLIC BLOOD PRESSURE < 130 MM HG: ICD-10-PCS | Mod: CPTII,S$GLB,, | Performed by: ANESTHESIOLOGY

## 2023-04-14 PROCEDURE — 1159F PR MEDICATION LIST DOCUMENTED IN MEDICAL RECORD: ICD-10-PCS | Mod: CPTII,S$GLB,, | Performed by: ANESTHESIOLOGY

## 2023-04-14 PROCEDURE — 99214 PR OFFICE/OUTPT VISIT, EST, LEVL IV, 30-39 MIN: ICD-10-PCS | Mod: S$GLB,,, | Performed by: ANESTHESIOLOGY

## 2023-04-14 PROCEDURE — 3074F SYST BP LT 130 MM HG: CPT | Mod: CPTII,S$GLB,, | Performed by: ANESTHESIOLOGY

## 2023-04-14 PROCEDURE — 3078F PR MOST RECENT DIASTOLIC BLOOD PRESSURE < 80 MM HG: ICD-10-PCS | Mod: CPTII,S$GLB,, | Performed by: ANESTHESIOLOGY

## 2023-04-14 PROCEDURE — 99214 OFFICE O/P EST MOD 30 MIN: CPT | Mod: S$GLB,,, | Performed by: ANESTHESIOLOGY

## 2023-04-14 RX ORDER — TRAMADOL HYDROCHLORIDE 50 MG/1
50 TABLET ORAL
COMMUNITY
Start: 2023-03-03

## 2023-04-14 RX ORDER — SACUBITRIL AND VALSARTAN 24; 26 MG/1; MG/1
1 TABLET, FILM COATED ORAL 2 TIMES DAILY
COMMUNITY

## 2023-04-14 RX ORDER — ALPRAZOLAM 0.5 MG/1
1 TABLET, ORALLY DISINTEGRATING ORAL ONCE AS NEEDED
Status: CANCELLED | OUTPATIENT
Start: 2023-04-14 | End: 2034-09-10

## 2023-04-14 NOTE — H&P (VIEW-ONLY)
"This note was completed with dictation software and grammatical errors may exist.    CC:  Back pain, left leg pain    HPI:  The patient is a 77-year-old woman with a history of hypertension, CAD on Plavix, history of lumbar surgery who presents in referral from [facility] C[facility], Neurosurgery for back pain and left leg pain.  She returns in follow-up today for back pain and leg pain.  She had seen Dr. velasco this for her right hip and was sent for an intra-articular injection on 05/11/2022 with unclear benefit.  He did bilateral trochanteric bursa injections on 02/27/2023 which seem to help for about a week or 2 but only slightly.  She denies any tenderness over her lateral hips, denies any pain in the groin.  She states the pain in the lateral hips and low back occurs with walking, relieved with sitting down.  She states that her back hurts sometimes with sitting down but she is able to place a "song/psalm?"  book behind her back and this seems to help.        Pain intervention history:  She has done some physical therapy in [place] where she lives without much relief.  She has been taking gabapentin 100 milligrams twice daily with no major relief.  She is status post left L3/4 and L4/5 transforaminal injection on 12/27/2019 with 50% relief of her back pain but no improvement in her left leg pain.  She is status post right 3/4 and L4/5 transforaminal epidural steroid injection on 01/28/2020 with 75% relief.  Has had GTB injections with no major relief.She is status post right 3/4 and L4/5 transforaminal epidural steroid injection on 01/28/2020 with 75% relief.    She is status post bilateral L4/5 transforaminal epidural steroid injections on 06/22/2021 with 80% relief.   She is status post bilateral L4/5 transforaminal epidural steroid injections on 06/06/2022 with 100% relief of her back and leg pain.  She is status post C7-T1 interlaminar epidural steroid injection on 07/01/2022 with 100% relief of her neck pain. "     Spine surgeries:  Lumbar surgery with Dr. Gordon, likely left L4/5 hemilaminectomy    Antineuropathics:  Gabapentin 300 3 times daily unclear benefit  NSAIDs:  Mobic 15  Physical therapy:  Antidepressants:  Muscle relaxers:  Opioids:  Antiplatelets/Anticoagulants:  Plavix, aspirin      ROS:  She reports back pain only.  Balance of review of systems is negative.    Past Medical History:   Diagnosis Date    Anticoagulant long-term use     CAD (coronary artery disease)     cabg x 4 2007; stents    DDD (degenerative disc disease), lumbar     Encounter for blood transfusion     Hypertension     AUDREY on CPAP     PVD (peripheral vascular disease)     Thyroid disease        Past Surgical History:   Procedure Laterality Date    ARTERIOGRAPHY OF AORTIC ROOT N/A 4/8/2022    Procedure: ARTERIOGRAM, AORTIC ROOT;  Surgeon: Miguel Ross III, MD;  Location: [facility] CATH;  Service: Cardiology;  Laterality: N/A;    BACK SURGERY      lumbar laminec    CARDIAC SURGERY  2007    x 4 vessels    CAROTID ENDARTERECTOMY Bilateral     each side done twice    CORONARY ANGIOPLASTY WITH STENT PLACEMENT      CORONARY ARTERY BYPASS GRAFT      CORONARY BYPASS GRAFT ANGIOGRAPHY  4/8/2022    Procedure: Bypass graft study;  Surgeon: Miguel Ross III, MD;  Location: [facility] CATH;  Service: Cardiology;;    EPIDURAL STEROID INJECTION INTO CERVICAL SPINE N/A 7/1/2022    Procedure: Injection-steroid-epidural-cervical;  Surgeon: Delmer Genao MD;  Location: [facility] OR;  Service: Pain Management;  Laterality: N/A;    HYSTERECTOMY      LEFT HEART CATHETERIZATION Left 4/8/2022    Procedure: CATHETERIZATION, HEART, LEFT;  Surgeon: Miguel Ross III, MD;  Location: [facility] CATH;  Service: Cardiology;  Laterality: Left;    PERIPHERAL ANGIOGRAPHY N/A 3/9/2021    Procedure: ANGIOGRAM, PERIPHERAL;  Surgeon: Miguel Goldsmith MD;  Location: [facility] CATH;  Service: Cardiology;  Laterality: N/A;    REVISION AMPUTATION OF FINGER Left     tip of left small finger traumatic  "amputation.      TRANSFORAMINAL EPIDURAL INJECTION OF STEROID Left 12/27/2019    Procedure: Injection,steroid,epidural,transforaminal approach L3/4 and L4/5;  Surgeon: Delmer Genao MD;  Location: [facility] OR;  Service: Pain Management;  Laterality: Left;    TRANSFORAMINAL EPIDURAL INJECTION OF STEROID Right 1/28/2020    Procedure: Injection,steroid,epidural,transforaminal approach L3/4 and L4/5;  Surgeon: Delmer Genao MD;  Location: [facility] OR;  Service: Pain Management;  Laterality: Right;    TRANSFORAMINAL EPIDURAL INJECTION OF STEROID Bilateral 6/22/2021    Procedure: Injection,steroid,epidural,transforaminal approach L4/5;  Surgeon: Delmer Genao MD;  Location: [facility] OR;  Service: Pain Management;  Laterality: Bilateral;    TRANSFORAMINAL EPIDURAL INJECTION OF STEROID Bilateral 6/6/2022    Procedure: Injection,steroid,epidural,transforaminal approach L4/5;  Surgeon: Delmer Genao MD;  Location: [facility] OR;  Service: Pain Management;  Laterality: Bilateral;    TRIGGER FINGER RELEASE Left 6/21/2018    Procedure: RELEASE-FINGER-TRIGGER-LEFT RING;  Surgeon: Norbert Ruelas MD;  Location: [facility] OR;  Service: Orthopedics;  Laterality: Left;    VASCULAR SURGERY         Social History     Socioeconomic History    Marital status:    Tobacco Use    Smoking status: Former     Packs/day: 1.50     Years: 40.00     Pack years: 60.00     Types: Cigarettes     Quit date: 7/7/2007     Years since quitting: 15.7    Smokeless tobacco: Never   Substance and Sexual Activity    Alcohol use: Not Currently     Alcohol/week: 2.0 standard drinks     Types: 2 Glasses of wine per week     Comment: socially    Drug use: No         Medications/Allergies: See med card    Vitals:    04/14/23 0922   BP: (!) 110/54   Pulse: 78   Temp: 98.6 °F (37 °C)   Weight: 69.3 kg (152 lb 12.5 oz)   Height: 5' 2" (1.575 m)   PainSc:   7   PainLoc: Back     Body mass index is 27.94 kg/m².        Physical exam:  Gen: A and O x3, pleasant, " well-groomed  Skin: No rashes or obvious lesions  HEENT: PERRLA, no obvious deformities on ears or in canals.Trachea midline.  CVS: Regular rate and rhythm, normal palpable pulses.  Resp: Clear to auscultation bilaterally, no wheezes or rales.  Abdomen: Soft, NT/ND.  Musculoskeletal: Able to heel walk, toe walk. No antalgic gait.     Neuro:  Motor:    Right Left   C4 Shoulder Abduction  5  5   C5 Elbow Flexion    5  5   C6 Wrist Extension  5  5   C7 Elbow Extension   5  5   C8/T1 Hand Intrinsics   5  5   C8 First Dorsal Interosseus  5  5   C8 Abductor Pollicus Brevis  5  5       Iliopsoas Quadriceps Knee  Flexion Tibialis  anterior Gastro- cnemius EHL   Lower: R     L         Left  Right    Triceps DTR 2+ 2+   Biceps DTR 2+ 2+   Brachioradialis DTR 2+ 2+   Patellar DTR 2+ 2+   Achilles DTR 2+ 2+   Roberts Absent  Absent   Clonus Absent Absent   Babinski Absent Absent     Sensory: Intact and symmetrical to light touch and pinprick in C2-T1 dermatomes bilaterally. Intact and symmetrical to light touch and pinprick in L1-S1 dermatomes bilaterally.    Cervical spine: ROM is full in flexion, extension and lateral rotation without increased pain.  Spurling's maneuver causes no neck pain to either side.  Myofascial exam: No Tenderness to palpation across cervical paraspinous region bilaterally.    Lumbar spine:  Lumbar spine: ROM is full with flexion extension and oblique extension with no increased pain.    Adair's test causes no increased pain on either side.    Supine straight leg raise is negative bilaterally.    Internal and external rotation of the hip causes no increased pain on either side.  Myofascial exam: No tenderness to palpation across lumbar paraspinous muscles.      Imagin19 MRI L-spine:  T12-L1: There is right facet joint arthropathy with ligamentum flavum thickening contributing to mild right foraminal stenosis.  There is no spinal stenosis.  L1-2:  There is bilateral, left greater than right, facet joint arthropathy with ligamentum flavum thickening and a minimal disc bulge.  There is mild left foraminal stenosis without spinal stenosis.  L2-3: There is marked disc space narrowing.  There is a disc bulge with osteophytic ridging and superimposed broad left paracentral disc protrusion with annular fissure.  There is facet joint arthropathy.  There is no spinal stenosis.  There is mild bilateral foraminal stenosis.  L3-4: There is moderate disc space narrowing.  There is a disc bulge with osteophytic ridging but there's a superimposed left paracentral and foraminal disc extrusion in addition to moderate facet joint arthropathy.  There is no significant spinal stenosis but there is severe left lateral recess and foraminal stenosis with mild-to-moderate right foraminal stenosis.  L4-5: There is marked disc space narrowing.  There's been left laminectomy.  There's right greater than left facet joint arthropathy.  There is a diffuse disc bulge with osteophytic ridging and superimposed large left paracentral disc extrusion contributing to moderate spinal stenosis, severe left and moderate right lateral recess stenosis as well as moderate to severe left and moderate right foraminal stenosis.  L5-S1: There is right greater than left facet joint arthropathy.  There's a mild disc bulge.  There is no significant spinal stenosis.  There is moderate right and mild left foraminal stenosis.    12/11/19 Xray L-spine:  There is a minimal rotary levocurvature of the lower lumbar spine.  There is multilevel degenerative disc and facet disease.  There is no fracture or malalignment and there is no abnormal excursion with flexion or extension.  There's marked disc space narrowing at the L2-3 through L4-5 levels.  There is multilevel facet joint arthropathy, most severe at the L5-S1 level.  There is moderate atherosclerosis.    4/19/22 Xray bilateral hips:  Moderate bilateral  femoroacetabular osteoarthritis.    Assessment:   The patient is a 77-year-old woman with a history of hypertension, CAD on Plavix, history of lumbar surgery who presents in referral from [facility] C[facility], Neurosurgery for back pain and left leg pain.     1. Spinal stenosis of lumbar region without neurogenic claudication        2. Lumbar radiculopathy  Place in Outpatient    Case Request Operating Room: Injection,steroid,epidural,transforaminal approach L4/5    Vital signs    Verify informed consent    Notify physician     Notify physician     Notify physician (specify)    Diet NPO    alprazolam ODT dissolvable tablet 1 mg          Plan:  1.  We reviewed her symptoms, reviewed her lumbar spine MRI again which shows severe canal and lateral recess stenosis especially off to the left side at L4/5 and severe lateral recess stenosis at L3/4 off to the left side with foraminal narrowing bilaterally at L3/4 and L4/5.  She did have some improvement with previous lumbar epidural steroid injections but unclear how long these had lasted.  We discussed that we could try this again to see if she gets relief but if it is not long-lasting, surgeries probably not a great option since this would probably need to be multilevel fusion.  If she is not having significant long-term relief, we may have her see Neurosurgery just for an opinion and if they do not recommend surgery, spinal cord stimulation may be an option.  I will set her up for a bilateral L4/5 transforaminal injection and have her follow up in several weeks after the injection.  She is going to be having a stress test this next week, we plans to ask for approval for aspirin and Plavix to be held and she is having a colonoscopy in the near future and hopefully we can hold the medication prior to both of these at the same time.

## 2023-04-14 NOTE — PROGRESS NOTES
"This note was completed with dictation software and grammatical errors may exist.    CC:  Back pain, left leg pain    HPI:  The patient is a 77-year-old woman with a history of hypertension, CAD on Plavix, history of lumbar surgery who presents in referral from [facility], Neurosurgery for back pain and left leg pain.  She returns in follow-up today for back pain and leg pain.  She had seen Dr. velasco this for her right hip and was sent for an intra-articular injection on 05/11/2022 with unclear benefit.  He did bilateral trochanteric bursa injections on 02/27/2023 which seem to help for about a week or 2 but only slightly.  She denies any tenderness over her lateral hips, denies any pain in the groin.  She states the pain in the lateral hips and low back occurs with walking, relieved with sitting down.  She states that her back hurts sometimes with sitting down but she is able to place a "song/psalm?"  book behind her back and this seems to help.        Pain intervention history:  She has done some physical therapy in [place] where she lives without much relief.  She has been taking gabapentin 100 milligrams twice daily with no major relief.  She is status post left L3/4 and L4/5 transforaminal injection on 12/27/2019 with 50% relief of her back pain but no improvement in her left leg pain.  She is status post right 3/4 and L4/5 transforaminal epidural steroid injection on 01/28/2020 with 75% relief.  Has had GTB injections with no major relief.She is status post right 3/4 and L4/5 transforaminal epidural steroid injection on 01/28/2020 with 75% relief.    She is status post bilateral L4/5 transforaminal epidural steroid injections on 06/22/2021 with 80% relief.   She is status post bilateral L4/5 transforaminal epidural steroid injections on 06/06/2022 with 100% relief of her back and leg pain.  She is status post C7-T1 interlaminar epidural steroid injection on 07/01/2022 with 100% relief of her neck pain. "     Spine surgeries:  Lumbar surgery with Dr. Gordon, likely left L4/5 hemilaminectomy    Antineuropathics:  Gabapentin 300 3 times daily unclear benefit  NSAIDs:  Mobic 15  Physical therapy:  Antidepressants:  Muscle relaxers:  Opioids:  Antiplatelets/Anticoagulants:  Plavix, aspirin      ROS:  She reports back pain only.  Balance of review of systems is negative.    Past Medical History:   Diagnosis Date    Anticoagulant long-term use     CAD (coronary artery disease)     cabg x 4 2007; stents    DDD (degenerative disc disease), lumbar     Encounter for blood transfusion     Hypertension     AUDREY on CPAP     PVD (peripheral vascular disease)     Thyroid disease        Past Surgical History:   Procedure Laterality Date    ARTERIOGRAPHY OF AORTIC ROOT N/A 4/8/2022    Procedure: ARTERIOGRAM, AORTIC ROOT;  Surgeon: Miguel Ross III, MD;  Location: [facility] CATH;  Service: Cardiology;  Laterality: N/A;    BACK SURGERY      lumbar laminec    CARDIAC SURGERY  2007    x 4 vessels    CAROTID ENDARTERECTOMY Bilateral     each side done twice    CORONARY ANGIOPLASTY WITH STENT PLACEMENT      CORONARY ARTERY BYPASS GRAFT      CORONARY BYPASS GRAFT ANGIOGRAPHY  4/8/2022    Procedure: Bypass graft study;  Surgeon: Miguel Ross III, MD;  Location: [facility] CATH;  Service: Cardiology;;    EPIDURAL STEROID INJECTION INTO CERVICAL SPINE N/A 7/1/2022    Procedure: Injection-steroid-epidural-cervical;  Surgeon: Delmer Genao MD;  Location: [facility] OR;  Service: Pain Management;  Laterality: N/A;    HYSTERECTOMY      LEFT HEART CATHETERIZATION Left 4/8/2022    Procedure: CATHETERIZATION, HEART, LEFT;  Surgeon: Miguel Ross III, MD;  Location: [facility] CATH;  Service: Cardiology;  Laterality: Left;    PERIPHERAL ANGIOGRAPHY N/A 3/9/2021    Procedure: ANGIOGRAM, PERIPHERAL;  Surgeon: Miguel Goldsmith MD;  Location: [facility] CATH;  Service: Cardiology;  Laterality: N/A;    REVISION AMPUTATION OF FINGER Left     tip of left small finger traumatic  "amputation.      TRANSFORAMINAL EPIDURAL INJECTION OF STEROID Left 12/27/2019    Procedure: Injection,steroid,epidural,transforaminal approach L3/4 and L4/5;  Surgeon: Delmer Genao MD;  Location: [facility] OR;  Service: Pain Management;  Laterality: Left;    TRANSFORAMINAL EPIDURAL INJECTION OF STEROID Right 1/28/2020    Procedure: Injection,steroid,epidural,transforaminal approach L3/4 and L4/5;  Surgeon: Delmer Genao MD;  Location: [facility] OR;  Service: Pain Management;  Laterality: Right;    TRANSFORAMINAL EPIDURAL INJECTION OF STEROID Bilateral 6/22/2021    Procedure: Injection,steroid,epidural,transforaminal approach L4/5;  Surgeon: Delmer Genao MD;  Location: [facility] OR;  Service: Pain Management;  Laterality: Bilateral;    TRANSFORAMINAL EPIDURAL INJECTION OF STEROID Bilateral 6/6/2022    Procedure: Injection,steroid,epidural,transforaminal approach L4/5;  Surgeon: Delmer Genao MD;  Location: [facility] OR;  Service: Pain Management;  Laterality: Bilateral;    TRIGGER FINGER RELEASE Left 6/21/2018    Procedure: RELEASE-FINGER-TRIGGER-LEFT RING;  Surgeon: Norbert Ruelas MD;  Location: [facility] OR;  Service: Orthopedics;  Laterality: Left;    VASCULAR SURGERY         Social History     Socioeconomic History    Marital status:    Tobacco Use    Smoking status: Former     Packs/day: 1.50     Years: 40.00     Pack years: 60.00     Types: Cigarettes     Quit date: 7/7/2007     Years since quitting: 15.7    Smokeless tobacco: Never   Substance and Sexual Activity    Alcohol use: Not Currently     Alcohol/week: 2.0 standard drinks     Types: 2 Glasses of wine per week     Comment: socially    Drug use: No         Medications/Allergies: See med card    Vitals:    04/14/23 0922   BP: (!) 110/54   Pulse: 78   Temp: 98.6 °F (37 °C)   Weight: 69.3 kg (152 lb 12.5 oz)   Height: 5' 2" (1.575 m)   PainSc:   7   PainLoc: Back     Body mass index is 27.94 kg/m².        Physical exam:  Gen: A and O x3, pleasant, " well-groomed  Skin: No rashes or obvious lesions  HEENT: PERRLA, no obvious deformities on ears or in canals.Trachea midline.  CVS: Regular rate and rhythm, normal palpable pulses.  Resp: Clear to auscultation bilaterally, no wheezes or rales.  Abdomen: Soft, NT/ND.  Musculoskeletal: Able to heel walk, toe walk. No antalgic gait.     Neuro:  Motor:    Right Left   C4 Shoulder Abduction  5  5   C5 Elbow Flexion    5  5   C6 Wrist Extension  5  5   C7 Elbow Extension   5  5   C8/T1 Hand Intrinsics   5  5   C8 First Dorsal Interosseus  5  5   C8 Abductor Pollicus Brevis  5  5       Iliopsoas Quadriceps Knee  Flexion Tibialis  anterior Gastro- cnemius EHL   Lower: R     L         Left  Right    Triceps DTR 2+ 2+   Biceps DTR 2+ 2+   Brachioradialis DTR 2+ 2+   Patellar DTR 2+ 2+   Achilles DTR 2+ 2+   Roberts Absent  Absent   Clonus Absent Absent   Babinski Absent Absent     Sensory: Intact and symmetrical to light touch and pinprick in C2-T1 dermatomes bilaterally. Intact and symmetrical to light touch and pinprick in L1-S1 dermatomes bilaterally.    Cervical spine: ROM is full in flexion, extension and lateral rotation without increased pain.  Spurling's maneuver causes no neck pain to either side.  Myofascial exam: No Tenderness to palpation across cervical paraspinous region bilaterally.    Lumbar spine:  Lumbar spine: ROM is full with flexion extension and oblique extension with no increased pain.    Adair's test causes no increased pain on either side.    Supine straight leg raise is negative bilaterally.    Internal and external rotation of the hip causes no increased pain on either side.  Myofascial exam: No tenderness to palpation across lumbar paraspinous muscles.      Imagin19 MRI L-spine:  T12-L1: There is right facet joint arthropathy with ligamentum flavum thickening contributing to mild right foraminal stenosis.  There is no spinal stenosis.  L1-2:  There is bilateral, left greater than right, facet joint arthropathy with ligamentum flavum thickening and a minimal disc bulge.  There is mild left foraminal stenosis without spinal stenosis.  L2-3: There is marked disc space narrowing.  There is a disc bulge with osteophytic ridging and superimposed broad left paracentral disc protrusion with annular fissure.  There is facet joint arthropathy.  There is no spinal stenosis.  There is mild bilateral foraminal stenosis.  L3-4: There is moderate disc space narrowing.  There is a disc bulge with osteophytic ridging but there's a superimposed left paracentral and foraminal disc extrusion in addition to moderate facet joint arthropathy.  There is no significant spinal stenosis but there is severe left lateral recess and foraminal stenosis with mild-to-moderate right foraminal stenosis.  L4-5: There is marked disc space narrowing.  There's been left laminectomy.  There's right greater than left facet joint arthropathy.  There is a diffuse disc bulge with osteophytic ridging and superimposed large left paracentral disc extrusion contributing to moderate spinal stenosis, severe left and moderate right lateral recess stenosis as well as moderate to severe left and moderate right foraminal stenosis.  L5-S1: There is right greater than left facet joint arthropathy.  There's a mild disc bulge.  There is no significant spinal stenosis.  There is moderate right and mild left foraminal stenosis.    12/11/19 Xray L-spine:  There is a minimal rotary levocurvature of the lower lumbar spine.  There is multilevel degenerative disc and facet disease.  There is no fracture or malalignment and there is no abnormal excursion with flexion or extension.  There's marked disc space narrowing at the L2-3 through L4-5 levels.  There is multilevel facet joint arthropathy, most severe at the L5-S1 level.  There is moderate atherosclerosis.    4/19/22 Xray bilateral hips:  Moderate bilateral  femoroacetabular osteoarthritis.    Assessment:   The patient is a 77-year-old woman with a history of hypertension, CAD on Plavix, history of lumbar surgery who presents in referral from [facility], Neurosurgery for back pain and left leg pain.     1. Spinal stenosis of lumbar region without neurogenic claudication        2. Lumbar radiculopathy  Place in Outpatient    Case Request Operating Room: Injection,steroid,epidural,transforaminal approach L4/5    Vital signs    Verify informed consent    Notify physician     Notify physician     Notify physician (specify)    Diet NPO    alprazolam ODT dissolvable tablet 1 mg          Plan:  1.  We reviewed her symptoms, reviewed her lumbar spine MRI again which shows severe canal and lateral recess stenosis especially off to the left side at L4/5 and severe lateral recess stenosis at L3/4 off to the left side with foraminal narrowing bilaterally at L3/4 and L4/5.  She did have some improvement with previous lumbar epidural steroid injections but unclear how long these had lasted.  We discussed that we could try this again to see if she gets relief but if it is not long-lasting, surgeries probably not a great option since this would probably need to be multilevel fusion.  If she is not having significant long-term relief, we may have her see Neurosurgery just for an opinion and if they do not recommend surgery, spinal cord stimulation may be an option.  I will set her up for a bilateral L4/5 transforaminal injection and have her follow up in several weeks after the injection.  She is going to be having a stress test this next week, we plans to ask for approval for aspirin and Plavix to be held and she is having a colonoscopy in the near future and hopefully we can hold the medication prior to both of these at the same time.

## 2023-05-01 RX ORDER — GABAPENTIN 300 MG/1
CAPSULE ORAL
Qty: 180 CAPSULE | Refills: 3 | Status: SHIPPED | OUTPATIENT
Start: 2023-05-01

## 2023-05-05 ENCOUNTER — HOSPITAL ENCOUNTER (OUTPATIENT)
Facility: HOSPITAL | Age: 78
Discharge: HOME | End: 2023-05-05
Attending: ANESTHESIOLOGY | Admitting: ANESTHESIOLOGY
Payer: MEDICARE

## 2023-05-05 ENCOUNTER — HOSPITAL ENCOUNTER (OUTPATIENT)
Dept: RADIOLOGY | Facility: HOSPITAL | Age: 78
Discharge: HOME | End: 2023-05-05
Attending: ANESTHESIOLOGY | Admitting: ANESTHESIOLOGY
Payer: MEDICARE

## 2023-05-05 DIAGNOSIS — M54.16 LUMBAR RADICULOPATHY: ICD-10-CM

## 2023-05-05 DIAGNOSIS — M54.50 LOWER BACK PAIN: ICD-10-CM

## 2023-05-05 PROCEDURE — 25000003 PHARM REV CODE 250: Mod: PO | Performed by: ANESTHESIOLOGY

## 2023-05-05 PROCEDURE — 25500020 PHARM REV CODE 255: Mod: PO | Performed by: ANESTHESIOLOGY

## 2023-05-05 PROCEDURE — 76000 FLUOROSCOPY <1 HR PHYS/QHP: CPT | Mod: TC,PO

## 2023-05-05 PROCEDURE — 63600175 PHARM REV CODE 636 W HCPCS: Mod: PO | Performed by: ANESTHESIOLOGY

## 2023-05-05 PROCEDURE — 64483 PR EPIDURAL INJ, ANES/STEROID, TRANSFORAMINAL, LUMB/SACR, SNGL LEVL: ICD-10-PCS | Mod: 50,,, | Performed by: ANESTHESIOLOGY

## 2023-05-05 PROCEDURE — 64483 NJX AA&/STRD TFRM EPI L/S 1: CPT | Mod: PO,LT | Performed by: ANESTHESIOLOGY

## 2023-05-05 PROCEDURE — 64483 NJX AA&/STRD TFRM EPI L/S 1: CPT | Mod: 50,,, | Performed by: ANESTHESIOLOGY

## 2023-05-05 RX ORDER — LIDOCAINE HYDROCHLORIDE 10 MG/ML
INJECTION, SOLUTION EPIDURAL; INFILTRATION; INTRACAUDAL; PERINEURAL
Status: DISCONTINUED | OUTPATIENT
Start: 2023-05-05 | End: 2023-05-05 | Stop reason: HOSPADM

## 2023-05-05 RX ORDER — METHYLPREDNISOLONE ACETATE 80 MG/ML
INJECTION, SUSPENSION INTRA-ARTICULAR; INTRALESIONAL; INTRAMUSCULAR; SOFT TISSUE
Status: DISCONTINUED | OUTPATIENT
Start: 2023-05-05 | End: 2023-05-05 | Stop reason: HOSPADM

## 2023-05-05 RX ORDER — BUPIVACAINE HYDROCHLORIDE 2.5 MG/ML
INJECTION, SOLUTION EPIDURAL; INFILTRATION; INTRACAUDAL
Status: DISCONTINUED | OUTPATIENT
Start: 2023-05-05 | End: 2023-05-05 | Stop reason: HOSPADM

## 2023-05-05 RX ORDER — ALPRAZOLAM 0.5 MG/1
1 TABLET, ORALLY DISINTEGRATING ORAL ONCE AS NEEDED
Status: COMPLETED | OUTPATIENT
Start: 2023-05-05 | End: 2023-05-05

## 2023-05-05 RX ADMIN — ALPRAZOLAM 1 MG: 0.5 TABLET, ORALLY DISINTEGRATING ORAL at 02:05

## 2023-05-05 NOTE — OP NOTE
PROCEDURE DATE: 5/5/2023    PROCEDURE: Bilateral L4/5 transforaminal epidural steroid injection under fluoroscopy    DIAGNOSIS: Lumbar  Radiculopathy    Post op diagnosis: Same    PHYSICIAN: Delmer Genao MD    MEDICATIONS INJECTED:  Methylprednisolone 40mg (1ml) and 1ml 0.25% bupivicaine at each nerve root.     LOCAL ANESTHETIC INJECTED:  Lidocaine 1%. 3 ml per site.    SEDATION MEDICATIONS: none    ESTIMATED BLOOD LOSS:  none    COMPLICATIONS:  none    TECHNIQUE:   A time-out was taken to identify patient and procedure side prior to starting the procedure. The patient was placed in a prone position, prepped and draped in the usual sterile fashion using ChloraPrep and sterile towels.  The area to be injected was determined under fluoroscopic guidance in AP and oblique view.  Local anesthetic was given by raising a wheal and going down to the hub of a 25-gauge 1.5 inch needle.  In oblique view, a 5 inch 22-gauge bent-tip spinal needle was introduced towards 6 oclock position of the pedicle of each above named nerve root level.  The needle was walked medially then hinged into the neural foramen and position was confirmed in AP and lateral views.  Omnipaque contrast dye was injected to confirm appropriate placement and that there was no vascular uptake.  After negative aspiration for blood or CSF, the medication was then injected. This was performed at the right and then left L4/5 level(s). The patient tolerated the procedure well.    The patient was monitored after the procedure.  Patient was given post procedure and discharge instructions to follow at home. The patient was discharged in a stable condition.

## 2023-05-05 NOTE — DISCHARGE SUMMARY
[facility] - [facility]  Discharge Note  Short Stay    Procedure(s) (LRB):  Injection,steroid,epidural,transforaminal approach L4/5 (Bilateral)      OUTCOME: Patient tolerated treatment/procedure well without complication and is now ready for discharge.    DISPOSITION: Home or Self Care    FINAL DIAGNOSIS:  Lumbar radiculopathy    FOLLOWUP: In clinic    DISCHARGE INSTRUCTIONS:    Discharge Procedure Orders   Diet Adult Regular     No dressing needed     Notify your health care provider if you experience any of the following:  temperature >100.4     Activity as tolerated

## 2023-05-05 NOTE — INTERVAL H&P NOTE
The patient has been examined and the H&P has been reviewed:    I concur with the findings and no changes have occurred since H&P was written.    Procedure risks, benefits and alternative options discussed and understood by patient/family.            There are no hospital problems to display for this patient.

## 2023-05-08 VITALS
DIASTOLIC BLOOD PRESSURE: 56 MMHG | HEART RATE: 70 BPM | TEMPERATURE: 98 F | WEIGHT: 152 LBS | SYSTOLIC BLOOD PRESSURE: 112 MMHG | RESPIRATION RATE: 16 BRPM | BODY MASS INDEX: 27.97 KG/M2 | HEIGHT: 62 IN | OXYGEN SATURATION: 97 %

## 2023-06-08 ENCOUNTER — OFFICE VISIT (OUTPATIENT)
Dept: PAIN MEDICINE | Facility: CLINIC | Age: 78
End: 2023-06-08
Payer: MEDICARE

## 2023-06-08 VITALS
HEART RATE: 71 BPM | WEIGHT: 153.44 LBS | DIASTOLIC BLOOD PRESSURE: 70 MMHG | HEIGHT: 62 IN | BODY MASS INDEX: 28.24 KG/M2 | SYSTOLIC BLOOD PRESSURE: 156 MMHG

## 2023-06-08 DIAGNOSIS — G89.29 CHRONIC HIP PAIN, BILATERAL: ICD-10-CM

## 2023-06-08 DIAGNOSIS — M25.551 CHRONIC HIP PAIN, BILATERAL: ICD-10-CM

## 2023-06-08 DIAGNOSIS — R26.81 GAIT INSTABILITY: ICD-10-CM

## 2023-06-08 DIAGNOSIS — M54.16 LUMBAR RADICULOPATHY: Primary | ICD-10-CM

## 2023-06-08 DIAGNOSIS — M51.36 DDD (DEGENERATIVE DISC DISEASE), LUMBAR: ICD-10-CM

## 2023-06-08 DIAGNOSIS — M25.552 CHRONIC HIP PAIN, BILATERAL: ICD-10-CM

## 2023-06-08 PROCEDURE — 1159F MED LIST DOCD IN RCRD: CPT | Mod: CPTII,S$GLB,, | Performed by: PHYSICIAN ASSISTANT

## 2023-06-08 PROCEDURE — 3078F DIAST BP <80 MM HG: CPT | Mod: CPTII,S$GLB,, | Performed by: PHYSICIAN ASSISTANT

## 2023-06-08 PROCEDURE — 1101F PT FALLS ASSESS-DOCD LE1/YR: CPT | Mod: CPTII,S$GLB,, | Performed by: PHYSICIAN ASSISTANT

## 2023-06-08 PROCEDURE — 99214 PR OFFICE/OUTPT VISIT, EST, LEVL IV, 30-39 MIN: ICD-10-PCS | Mod: S$GLB,,, | Performed by: PHYSICIAN ASSISTANT

## 2023-06-08 PROCEDURE — 1125F PR PAIN SEVERITY QUANTIFIED, PAIN PRESENT: ICD-10-PCS | Mod: CPTII,S$GLB,, | Performed by: PHYSICIAN ASSISTANT

## 2023-06-08 PROCEDURE — 3288F PR FALLS RISK ASSESSMENT DOCUMENTED: ICD-10-PCS | Mod: CPTII,S$GLB,, | Performed by: PHYSICIAN ASSISTANT

## 2023-06-08 PROCEDURE — 1160F RVW MEDS BY RX/DR IN RCRD: CPT | Mod: CPTII,S$GLB,, | Performed by: PHYSICIAN ASSISTANT

## 2023-06-08 PROCEDURE — 3077F SYST BP >= 140 MM HG: CPT | Mod: CPTII,S$GLB,, | Performed by: PHYSICIAN ASSISTANT

## 2023-06-08 PROCEDURE — 1101F PR PT FALLS ASSESS DOC 0-1 FALLS W/OUT INJ PAST YR: ICD-10-PCS | Mod: CPTII,S$GLB,, | Performed by: PHYSICIAN ASSISTANT

## 2023-06-08 PROCEDURE — 1159F PR MEDICATION LIST DOCUMENTED IN MEDICAL RECORD: ICD-10-PCS | Mod: CPTII,S$GLB,, | Performed by: PHYSICIAN ASSISTANT

## 2023-06-08 PROCEDURE — 1125F AMNT PAIN NOTED PAIN PRSNT: CPT | Mod: CPTII,S$GLB,, | Performed by: PHYSICIAN ASSISTANT

## 2023-06-08 PROCEDURE — 99999 PR PBB SHADOW E&M-EST. PATIENT-LVL V: ICD-10-PCS | Mod: PBBFAC,,, | Performed by: PHYSICIAN ASSISTANT

## 2023-06-08 PROCEDURE — 1160F PR REVIEW ALL MEDS BY PRESCRIBER/CLIN PHARMACIST DOCUMENTED: ICD-10-PCS | Mod: CPTII,S$GLB,, | Performed by: PHYSICIAN ASSISTANT

## 2023-06-08 PROCEDURE — 99999 PR PBB SHADOW E&M-EST. PATIENT-LVL V: CPT | Mod: PBBFAC,,, | Performed by: PHYSICIAN ASSISTANT

## 2023-06-08 PROCEDURE — 3288F FALL RISK ASSESSMENT DOCD: CPT | Mod: CPTII,S$GLB,, | Performed by: PHYSICIAN ASSISTANT

## 2023-06-08 PROCEDURE — 3078F PR MOST RECENT DIASTOLIC BLOOD PRESSURE < 80 MM HG: ICD-10-PCS | Mod: CPTII,S$GLB,, | Performed by: PHYSICIAN ASSISTANT

## 2023-06-08 PROCEDURE — 3077F PR MOST RECENT SYSTOLIC BLOOD PRESSURE >= 140 MM HG: ICD-10-PCS | Mod: CPTII,S$GLB,, | Performed by: PHYSICIAN ASSISTANT

## 2023-06-08 PROCEDURE — 99214 OFFICE O/P EST MOD 30 MIN: CPT | Mod: S$GLB,,, | Performed by: PHYSICIAN ASSISTANT

## 2023-06-08 RX ORDER — ALPRAZOLAM 0.5 MG/1
0.5 TABLET, ORALLY DISINTEGRATING ORAL ONCE AS NEEDED
Status: CANCELLED | OUTPATIENT
Start: 2023-06-08 | End: 2034-11-04

## 2023-06-13 NOTE — H&P (VIEW-ONLY)
This note was completed with dictation software and grammatical errors may exist.    CC:  Back pain, left leg pain    HPI:  The patient is a 77-year-old woman with a history of hypertension, CAD on Plavix, history of lumbar surgery who presents in referral from [facility], Neurosurgery for back pain and left leg pain.  She is status post bilateral L4/5 transforaminal epidural steroid injections in 05/05/2023 with 50% relief.  She continues to have pain across the low back radiating to the lateral hips.  This is worse with walking and she reports being off balance.  She no longer has to sit in [place] with a [employer] book behind her for relief.  She denies weakness or numbness, denies bladder or bowel incontinence.  She also complains of bilateral hip joint pain but not as bad as her low back at this time.    Pain intervention history:  She has done some physical therapy in [place] where she lives without much relief.  She has been taking gabapentin 100 milligrams twice daily with no major relief.  She is status post left L3/4 and L4/5 transforaminal injection on 12/27/2019 with 50% relief of her back pain but no improvement in her left leg pain.  She is status post right 3/4 and L4/5 transforaminal epidural steroid injection on 01/28/2020 with 75% relief.  Has had GTB injections with no major relief.She is status post right 3/4 and L4/5 transforaminal epidural steroid injection on 01/28/2020 with 75% relief.    She is status post bilateral L4/5 transforaminal epidural steroid injections on 06/22/2021 with 80% relief.   She is status post bilateral L4/5 transforaminal epidural steroid injections on 06/06/2022 with 100% relief of her back and leg pain.  She is status post C7-T1 interlaminar epidural steroid injection on 07/01/2022 with 100% relief of her neck pain. She had seen Dr. Jessenia osorio for her right hip and was sent for an intra-articular injection on 05/11/2022 with unclear benefit.  He did bilateral  trochanteric bursa injections on 02/27/2023 which seem to help for about a week or 2 but only slightly. She is status post bilateral L4/5 transforaminal epidural steroid injections in 05/05/2023 with 50% relief.     Spine surgeries:  Lumbar surgery with Dr. Gordon, likely left L4/5 hemilaminectomy    Antineuropathics:  Gabapentin 300 3 times daily unclear benefit  NSAIDs:  Mobic 15  Physical therapy:  Antidepressants:  Muscle relaxers:  Opioids:  Antiplatelets/Anticoagulants:  Plavix, aspirin      ROS:  She reports back pain only.  Balance of review of systems is negative.    Past Medical History:   Diagnosis Date    Anticoagulant long-term use     CAD (coronary artery disease)     cabg x 4 2007; stents    DDD (degenerative disc disease), lumbar     Encounter for blood transfusion     Hypertension     AUDREY on CPAP     PVD (peripheral vascular disease)     Thyroid disease        Past Surgical History:   Procedure Laterality Date    ARTERIOGRAPHY OF AORTIC ROOT N/A 4/8/2022    Procedure: ARTERIOGRAM, AORTIC ROOT;  Surgeon: Miguel Ross III, MD;  [facility];  Service: Cardiology;  Laterality: N/A;    BACK SURGERY      lumbar laminec    CARDIAC SURGERY  2007    x 4 vessels    CAROTID ENDARTERECTOMY Bilateral     each side done twice    CORONARY ANGIOPLASTY WITH STENT PLACEMENT      CORONARY ARTERY BYPASS GRAFT      CORONARY BYPASS GRAFT ANGIOGRAPHY  4/8/2022    Procedure: Bypass graft study;  Surgeon: Miguel Ross III, MD;  Location: [facility];  Service: Cardiology;;    EPIDURAL STEROID INJECTION INTO CERVICAL SPINE N/A 7/1/2022    Procedure: Injection-steroid-epidural-cervical;  Surgeon: Delmer Genao MD;  Location: [facility] OR;  Service: Pain Management;  Laterality: N/A;    HYSTERECTOMY      LEFT HEART CATHETERIZATION Left 4/8/2022    Procedure: CATHETERIZATION, HEART, LEFT;  Surgeon: Miguel Ross III, MD;  Location: [facility];  Service: Cardiology;  Laterality: Left;    PERIPHERAL ANGIOGRAPHY N/A 3/9/2021     Procedure: ANGIOGRAM, PERIPHERAL;  Surgeon: Miguel Goldsmith MD;  Location: [facility];  Service: Cardiology;  Laterality: N/A;    REVISION AMPUTATION OF FINGER Left     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      TRANSFORAMINAL EPIDURAL INJECTION OF STEROID Left 12/27/2019    Procedure: Injection,steroid,epidural,transforaminal approach L3/4 and L4/5;  Surgeon: Delmer Genao MD;  Location: [facility] OR;  Service: Pain Management;  Laterality: Left;    TRANSFORAMINAL EPIDURAL INJECTION OF STEROID Right 1/28/2020    Procedure: Injection,steroid,epidural,transforaminal approach L3/4 and L4/5;  Surgeon: Delmer Genao MD;  Location: [facility] OR;  Service: Pain Management;  Laterality: Right;    TRANSFORAMINAL EPIDURAL INJECTION OF STEROID Bilateral 6/22/2021    Procedure: Injection,steroid,epidural,transforaminal approach L4/5;  Surgeon: Delmer Genao MD;  Location: [facility] OR;  Service: Pain Management;  Laterality: Bilateral;    TRANSFORAMINAL EPIDURAL INJECTION OF STEROID Bilateral 6/6/2022    Procedure: Injection,steroid,epidural,transforaminal approach L4/5;  Surgeon: Delmer Genao MD;  Location: [facility] OR;  Service: Pain Management;  Laterality: Bilateral;    TRANSFORAMINAL EPIDURAL INJECTION OF STEROID Bilateral 5/5/2023    Procedure: Injection,steroid,epidural,transforaminal approach L4/5;  Surgeon: Delmer Genao MD;  Location: [facility] OR;  Service: Pain Management;  Laterality: Bilateral;    TRIGGER FINGER RELEASE Left 6/21/2018    Procedure: RELEASE-FINGER-TRIGGER-LEFT RING;  Surgeon: Norbert Ruelas MD;  Location: [facility] OR;  Service: Orthopedics;  Laterality: Left;    VASCULAR SURGERY         Social History     Socioeconomic History    Marital status:    Tobacco Use    Smoking status: Former     Packs/day: 1.50     Years: 40.00     Pack years: 60.00     Types: Cigarettes     Quit date: 7/7/2007     Years since quitting: 15.9    Smokeless tobacco: Never   Substance and Sexual  "Activity    Alcohol use: Not Currently     Alcohol/week: 2.0 standard drinks     Types: 2 Glasses of wine per week     Comment: socially    Drug use: No         Medications/Allergies: See med card    Vitals:    06/08/23 1050   BP: (!) 156/70   Pulse: 71   Weight: 69.6 kg (153 lb 7 oz)   Height: 5' 2" (1.575 m)   PainSc:   8   PainLoc: Back     Body mass index is 28.06 kg/m².        Physical exam:  Gen: A and O x3, pleasant, well-groomed  Skin: No rashes or obvious lesions  HEENT: PERRLA, no obvious deformities on ears or in canals.Trachea midline.  CVS: Regular rate and rhythm, normal palpable pulses.  Resp: Clear to auscultation bilaterally, no wheezes or rales.  Abdomen: Soft, NT/ND.  Musculoskeletal: Able to heel walk, toe walk. No antalgic gait.     Neuro:  Motor:    Right Left   C4 Shoulder Abduction  5  5   C5 Elbow Flexion    5  5   C6 Wrist Extension  5  5   C7 Elbow Extension   5  5   C8/T1 Hand Intrinsics   5  5   C8 First Dorsal Interosseus  5  5   C8 Abductor Pollicus Brevis  5  5       Iliopsoas Quadriceps Knee  Flexion Tibialis  anterior Gastro- cnemius EHL   Lower: R 5/5 5/5 5/5 5/5 5/5 5/5    L 5/5 5/5 5/5 5/5 5/5 5/5        Left  Right    Triceps DTR 2+ 2+   Biceps DTR 2+ 2+   Brachioradialis DTR 2+ 2+   Patellar DTR 2+ 2+   Achilles DTR 2+ 2+   Roberts Absent  Absent   Clonus Absent Absent   Babinski Absent Absent     Sensory: Intact and symmetrical to light touch and pinprick in C2-T1 dermatomes bilaterally. Intact and symmetrical to light touch and pinprick in L1-S1 dermatomes bilaterally.    Cervical spine: ROM is full in flexion, extension and lateral rotation without increased pain.  Spurling's maneuver causes no neck pain to either side.  Myofascial exam: No Tenderness to palpation across cervical paraspinous region bilaterally.    Lumbar spine:  Lumbar spine: ROM is full with flexion extension and oblique extension with no increased pain.    Adair's test causes no increased pain on either " side.    Supine straight leg raise is negative bilaterally.    Internal and external rotation of the hip causes no increased pain on either side.  Myofascial exam: No tenderness to palpation across lumbar paraspinous muscles.      Imagin19 MRI L-spine:  T12-L1: There is right facet joint arthropathy with ligamentum flavum thickening contributing to mild right foraminal stenosis.  There is no spinal stenosis.  L1-2: There is bilateral, left greater than right, facet joint arthropathy with ligamentum flavum thickening and a minimal disc bulge.  There is mild left foraminal stenosis without spinal stenosis.  L2-3: There is marked disc space narrowing.  There is a disc bulge with osteophytic ridging and superimposed broad left paracentral disc protrusion with annular fissure.  There is facet joint arthropathy.  There is no spinal stenosis.  There is mild bilateral foraminal stenosis.  L3-4: There is moderate disc space narrowing.  There is a disc bulge with osteophytic ridging but there's a superimposed left paracentral and foraminal disc extrusion in addition to moderate facet joint arthropathy.  There is no significant spinal stenosis but there is severe left lateral recess and foraminal stenosis with mild-to-moderate right foraminal stenosis.  L4-5: There is marked disc space narrowing.  There's been left laminectomy.  There's right greater than left facet joint arthropathy.  There is a diffuse disc bulge with osteophytic ridging and superimposed large left paracentral disc extrusion contributing to moderate spinal stenosis, severe left and moderate right lateral recess stenosis as well as moderate to severe left and moderate right foraminal stenosis.  L5-S1: There is right greater than left facet joint arthropathy.  There's a mild disc bulge.  There is no significant spinal stenosis.  There is moderate right and mild left foraminal stenosis.    19 Xray L-spine:  There is a minimal rotary  levocurvature of the lower lumbar spine.  There is multilevel degenerative disc and facet disease.  There is no fracture or malalignment and there is no abnormal excursion with flexion or extension.  There's marked disc space narrowing at the L2-3 through L4-5 levels.  There is multilevel facet joint arthropathy, most severe at the L5-S1 level.  There is moderate atherosclerosis.    4/19/22 Xray bilateral hips:  Moderate bilateral femoroacetabular osteoarthritis.    Assessment:   The patient is a 77-year-old woman with a history of hypertension, CAD on Plavix, history of lumbar surgery who presents in referral from [facility], Neurosurgery for back pain and left leg pain.     1. Lumbar radiculopathy  Ambulatory referral/consult to Physical/Occupational Therapy    Place in Outpatient    Case Request Operating Room: Injection,steroid,epidural,transforaminal approach L4/5    Vital signs    Verify informed consent    Notify physician     Notify physician     Notify physician (specify)    Diet NPO    alprazolam ODT dissolvable tablet 0.5 mg      2. DDD (degenerative disc disease), lumbar        3. Chronic hip pain, bilateral  Ambulatory referral/consult to Physical/Occupational Therapy      4. Gait instability  Ambulatory referral/consult to Physical/Occupational Therapy          Plan:  1. The patient did well following the bilateral L4/5 transforaminal epidural steroid injections but continues to have pain.  I will schedule her to repeat this to see if she can get closer to full relief.  2. I completed orders for physical therapy at [facility] in [place].  3. Follow-up in 4 weeks postprocedure or sooner as needed.

## 2023-06-13 NOTE — PROGRESS NOTES
This note was completed with dictation software and grammatical errors may exist.    CC:  Back pain, left leg pain    HPI:  The patient is a 77-year-old woman with a history of hypertension, CAD on Plavix, history of lumbar surgery who presents in referral from [facility], Neurosurgery for back pain and left leg pain.  She is status post bilateral L4/5 transforaminal epidural steroid injections in 05/05/2023 with 50% relief.  She continues to have pain across the low back radiating to the lateral hips.  This is worse with walking and she reports being off balance.  She no longer has to sit in [employer] with a [employer] book behind her for relief.  She denies weakness or numbness, denies bladder or bowel incontinence.  She also complains of bilateral hip joint pain but not as bad as her low back at this time.    Pain intervention history:  She has done some physical therapy in [place] where she lives without much relief.  She has been taking gabapentin 100 milligrams twice daily with no major relief.  She is status post left L3/4 and L4/5 transforaminal injection on 12/27/2019 with 50% relief of her back pain but no improvement in her left leg pain.  She is status post right 3/4 and L4/5 transforaminal epidural steroid injection on 01/28/2020 with 75% relief.  Has had GTB injections with no major relief.She is status post right 3/4 and L4/5 transforaminal epidural steroid injection on 01/28/2020 with 75% relief.    She is status post bilateral L4/5 transforaminal epidural steroid injections on 06/22/2021 with 80% relief.   She is status post bilateral L4/5 transforaminal epidural steroid injections on 06/06/2022 with 100% relief of her back and leg pain.  She is status post C7-T1 interlaminar epidural steroid injection on 07/01/2022 with 100% relief of her neck pain. She had seen Dr. Jessenia osorio for her right hip and was sent for an intra-articular injection on 05/11/2022 with unclear benefit.  He did bilateral  trochanteric bursa injections on 02/27/2023 which seem to help for about a week or 2 but only slightly. She is status post bilateral L4/5 transforaminal epidural steroid injections in 05/05/2023 with 50% relief.     Spine surgeries:  Lumbar surgery with Dr. Gordon, likely left L4/5 hemilaminectomy    Antineuropathics:  Gabapentin 300 3 times daily unclear benefit  NSAIDs:  Mobic 15  Physical therapy:  Antidepressants:  Muscle relaxers:  Opioids:  Antiplatelets/Anticoagulants:  Plavix, aspirin      ROS:  She reports back pain only.  Balance of review of systems is negative.    Past Medical History:   Diagnosis Date    Anticoagulant long-term use     CAD (coronary artery disease)     cabg x 4 2007; stents    DDD (degenerative disc disease), lumbar     Encounter for blood transfusion     Hypertension     AUDREY on CPAP     PVD (peripheral vascular disease)     Thyroid disease        Past Surgical History:   Procedure Laterality Date    ARTERIOGRAPHY OF AORTIC ROOT N/A 4/8/2022    Procedure: ARTERIOGRAM, AORTIC ROOT;  Surgeon: Miguel Ross III, MD;  [facility];  Service: Cardiology;  Laterality: N/A;    BACK SURGERY      lumbar laminec    CARDIAC SURGERY  2007    x 4 vessels    CAROTID ENDARTERECTOMY Bilateral     each side done twice    CORONARY ANGIOPLASTY WITH STENT PLACEMENT      CORONARY ARTERY BYPASS GRAFT      CORONARY BYPASS GRAFT ANGIOGRAPHY  4/8/2022    Procedure: Bypass graft study;  Surgeon: Miguel Ross III, MD;  Location: [facility];  Service: Cardiology;;    EPIDURAL STEROID INJECTION INTO CERVICAL SPINE N/A 7/1/2022    Procedure: Injection-steroid-epidural-cervical;  Surgeon: Delmer Genao MD;  Location: [facility] OR;  Service: Pain Management;  Laterality: N/A;    HYSTERECTOMY      LEFT HEART CATHETERIZATION Left 4/8/2022    Procedure: CATHETERIZATION, HEART, LEFT;  Surgeon: Miguel Ross III, MD;  Location: [facility];  Service: Cardiology;  Laterality: Left;    PERIPHERAL ANGIOGRAPHY N/A 3/9/2021     Procedure: ANGIOGRAM, PERIPHERAL;  Surgeon: Miguel Goldsmith MD;  Location: [facility];  Service: Cardiology;  Laterality: N/A;    REVISION AMPUTATION OF FINGER Left     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      TRANSFORAMINAL EPIDURAL INJECTION OF STEROID Left 12/27/2019    Procedure: Injection,steroid,epidural,transforaminal approach L3/4 and L4/5;  Surgeon: Delmer Genao MD;  Location: [facility] OR;  Service: Pain Management;  Laterality: Left;    TRANSFORAMINAL EPIDURAL INJECTION OF STEROID Right 1/28/2020    Procedure: Injection,steroid,epidural,transforaminal approach L3/4 and L4/5;  Surgeon: Delmer Genao MD;  Location: [facility] OR;  Service: Pain Management;  Laterality: Right;    TRANSFORAMINAL EPIDURAL INJECTION OF STEROID Bilateral 6/22/2021    Procedure: Injection,steroid,epidural,transforaminal approach L4/5;  Surgeon: Delmer Genao MD;  Location: [facility] OR;  Service: Pain Management;  Laterality: Bilateral;    TRANSFORAMINAL EPIDURAL INJECTION OF STEROID Bilateral 6/6/2022    Procedure: Injection,steroid,epidural,transforaminal approach L4/5;  Surgeon: Delmer Genao MD;  Location: [facility] OR;  Service: Pain Management;  Laterality: Bilateral;    TRANSFORAMINAL EPIDURAL INJECTION OF STEROID Bilateral 5/5/2023    Procedure: Injection,steroid,epidural,transforaminal approach L4/5;  Surgeon: Delmer Genao MD;  Location: [facility] OR;  Service: Pain Management;  Laterality: Bilateral;    TRIGGER FINGER RELEASE Left 6/21/2018    Procedure: RELEASE-FINGER-TRIGGER-LEFT RING;  Surgeon: Norbert Ruelas MD;  Location: [facility] OR;  Service: Orthopedics;  Laterality: Left;    VASCULAR SURGERY         Social History     Socioeconomic History    Marital status:    Tobacco Use    Smoking status: Former     Packs/day: 1.50     Years: 40.00     Pack years: 60.00     Types: Cigarettes     Quit date: 7/7/2007     Years since quitting: 15.9    Smokeless tobacco: Never   Substance and Sexual  "Activity    Alcohol use: Not Currently     Alcohol/week: 2.0 standard drinks     Types: 2 Glasses of wine per week     Comment: socially    Drug use: No         Medications/Allergies: See med card    Vitals:    06/08/23 1050   BP: (!) 156/70   Pulse: 71   Weight: 69.6 kg (153 lb 7 oz)   Height: 5' 2" (1.575 m)   PainSc:   8   PainLoc: Back     Body mass index is 28.06 kg/m².        Physical exam:  Gen: A and O x3, pleasant, well-groomed  Skin: No rashes or obvious lesions  HEENT: PERRLA, no obvious deformities on ears or in canals.Trachea midline.  CVS: Regular rate and rhythm, normal palpable pulses.  Resp: Clear to auscultation bilaterally, no wheezes or rales.  Abdomen: Soft, NT/ND.  Musculoskeletal: Able to heel walk, toe walk. No antalgic gait.     Neuro:  Motor:    Right Left   C4 Shoulder Abduction  5  5   C5 Elbow Flexion    5  5   C6 Wrist Extension  5  5   C7 Elbow Extension   5  5   C8/T1 Hand Intrinsics   5  5   C8 First Dorsal Interosseus  5  5   C8 Abductor Pollicus Brevis  5  5       Iliopsoas Quadriceps Knee  Flexion Tibialis  anterior Gastro- cnemius EHL   Lower: R 5/5 5/5 5/5 5/5 5/5 5/5    L 5/5 5/5 5/5 5/5 5/5 5/5        Left  Right    Triceps DTR 2+ 2+   Biceps DTR 2+ 2+   Brachioradialis DTR 2+ 2+   Patellar DTR 2+ 2+   Achilles DTR 2+ 2+   Roberts Absent  Absent   Clonus Absent Absent   Babinski Absent Absent     Sensory: Intact and symmetrical to light touch and pinprick in C2-T1 dermatomes bilaterally. Intact and symmetrical to light touch and pinprick in L1-S1 dermatomes bilaterally.    Cervical spine: ROM is full in flexion, extension and lateral rotation without increased pain.  Spurling's maneuver causes no neck pain to either side.  Myofascial exam: No Tenderness to palpation across cervical paraspinous region bilaterally.    Lumbar spine:  Lumbar spine: ROM is full with flexion extension and oblique extension with no increased pain.    Adair's test causes no increased pain on either " side.    Supine straight leg raise is negative bilaterally.    Internal and external rotation of the hip causes no increased pain on either side.  Myofascial exam: No tenderness to palpation across lumbar paraspinous muscles.      Imagin19 MRI L-spine:  T12-L1: There is right facet joint arthropathy with ligamentum flavum thickening contributing to mild right foraminal stenosis.  There is no spinal stenosis.  L1-2: There is bilateral, left greater than right, facet joint arthropathy with ligamentum flavum thickening and a minimal disc bulge.  There is mild left foraminal stenosis without spinal stenosis.  L2-3: There is marked disc space narrowing.  There is a disc bulge with osteophytic ridging and superimposed broad left paracentral disc protrusion with annular fissure.  There is facet joint arthropathy.  There is no spinal stenosis.  There is mild bilateral foraminal stenosis.  L3-4: There is moderate disc space narrowing.  There is a disc bulge with osteophytic ridging but there's a superimposed left paracentral and foraminal disc extrusion in addition to moderate facet joint arthropathy.  There is no significant spinal stenosis but there is severe left lateral recess and foraminal stenosis with mild-to-moderate right foraminal stenosis.  L4-5: There is marked disc space narrowing.  There's been left laminectomy.  There's right greater than left facet joint arthropathy.  There is a diffuse disc bulge with osteophytic ridging and superimposed large left paracentral disc extrusion contributing to moderate spinal stenosis, severe left and moderate right lateral recess stenosis as well as moderate to severe left and moderate right foraminal stenosis.  L5-S1: There is right greater than left facet joint arthropathy.  There's a mild disc bulge.  There is no significant spinal stenosis.  There is moderate right and mild left foraminal stenosis.    19 Xray L-spine:  There is a minimal rotary  levocurvature of the lower lumbar spine.  There is multilevel degenerative disc and facet disease.  There is no fracture or malalignment and there is no abnormal excursion with flexion or extension.  There's marked disc space narrowing at the L2-3 through L4-5 levels.  There is multilevel facet joint arthropathy, most severe at the L5-S1 level.  There is moderate atherosclerosis.    4/19/22 Xray bilateral hips:  Moderate bilateral femoroacetabular osteoarthritis.    Assessment:   The patient is a 77-year-old woman with a history of hypertension, CAD on Plavix, history of lumbar surgery who presents in referral from [facility], Neurosurgery for back pain and left leg pain.     1. Lumbar radiculopathy  Ambulatory referral/consult to Physical/Occupational Therapy    Place in Outpatient    Case Request Operating Room: Injection,steroid,epidural,transforaminal approach L4/5    Vital signs    Verify informed consent    Notify physician     Notify physician     Notify physician (specify)    Diet NPO    alprazolam ODT dissolvable tablet 0.5 mg      2. DDD (degenerative disc disease), lumbar        3. Chronic hip pain, bilateral  Ambulatory referral/consult to Physical/Occupational Therapy      4. Gait instability  Ambulatory referral/consult to Physical/Occupational Therapy          Plan:  1. The patient did well following the bilateral L4/5 transforaminal epidural steroid injections but continues to have pain.  I will schedule her to repeat this to see if she can get closer to full relief.  2. I completed orders for physical therapy at [facility] in [place].  3. Follow-up in 4 weeks postprocedure or sooner as needed.

## 2023-07-06 ENCOUNTER — HOSPITAL ENCOUNTER (OUTPATIENT)
Dept: RADIOLOGY | Facility: HOSPITAL | Age: 78
Discharge: HOME | End: 2023-07-06
Attending: ANESTHESIOLOGY | Admitting: ANESTHESIOLOGY
Payer: MEDICARE

## 2023-07-06 ENCOUNTER — HOSPITAL ENCOUNTER (OUTPATIENT)
Facility: HOSPITAL | Age: 78
Discharge: HOME | End: 2023-07-06
Attending: ANESTHESIOLOGY | Admitting: ANESTHESIOLOGY
Payer: MEDICARE

## 2023-07-06 VITALS
DIASTOLIC BLOOD PRESSURE: 54 MMHG | HEIGHT: 62 IN | RESPIRATION RATE: 18 BRPM | WEIGHT: 153 LBS | SYSTOLIC BLOOD PRESSURE: 116 MMHG | HEART RATE: 70 BPM | OXYGEN SATURATION: 97 % | BODY MASS INDEX: 28.16 KG/M2 | TEMPERATURE: 98 F

## 2023-07-06 DIAGNOSIS — M54.50 LOWER BACK PAIN: ICD-10-CM

## 2023-07-06 DIAGNOSIS — M54.16 LUMBAR RADICULOPATHY: ICD-10-CM

## 2023-07-06 PROCEDURE — 76000 FLUOROSCOPY <1 HR PHYS/QHP: CPT | Mod: TC,PO

## 2023-07-06 PROCEDURE — 25000003 PHARM REV CODE 250: Mod: PO | Performed by: PHYSICIAN ASSISTANT

## 2023-07-06 PROCEDURE — 64483 NJX AA&/STRD TFRM EPI L/S 1: CPT | Mod: 50,,, | Performed by: ANESTHESIOLOGY

## 2023-07-06 PROCEDURE — 64483 PR EPIDURAL INJ, ANES/STEROID, TRANSFORAMINAL, LUMB/SACR, SNGL LEVL: ICD-10-PCS | Mod: 50,,, | Performed by: ANESTHESIOLOGY

## 2023-07-06 PROCEDURE — 25500020 PHARM REV CODE 255: Mod: PO | Performed by: ANESTHESIOLOGY

## 2023-07-06 PROCEDURE — 25000003 PHARM REV CODE 250: Mod: PO | Performed by: ANESTHESIOLOGY

## 2023-07-06 PROCEDURE — 63600175 PHARM REV CODE 636 W HCPCS: Mod: PO | Performed by: ANESTHESIOLOGY

## 2023-07-06 PROCEDURE — 64483 NJX AA&/STRD TFRM EPI L/S 1: CPT | Mod: 50,PO | Performed by: ANESTHESIOLOGY

## 2023-07-06 RX ORDER — LIDOCAINE HYDROCHLORIDE 10 MG/ML
INJECTION, SOLUTION EPIDURAL; INFILTRATION; INTRACAUDAL; PERINEURAL
Status: DISCONTINUED | OUTPATIENT
Start: 2023-07-06 | End: 2023-07-06 | Stop reason: HOSPADM

## 2023-07-06 RX ORDER — METHYLPREDNISOLONE ACETATE 80 MG/ML
INJECTION, SUSPENSION INTRA-ARTICULAR; INTRALESIONAL; INTRAMUSCULAR; SOFT TISSUE
Status: DISCONTINUED | OUTPATIENT
Start: 2023-07-06 | End: 2023-07-06 | Stop reason: HOSPADM

## 2023-07-06 RX ORDER — ALPRAZOLAM 0.5 MG/1
0.5 TABLET, ORALLY DISINTEGRATING ORAL ONCE AS NEEDED
Status: COMPLETED | OUTPATIENT
Start: 2023-07-06 | End: 2023-07-06

## 2023-07-06 RX ORDER — BUPIVACAINE HYDROCHLORIDE 5 MG/ML
INJECTION, SOLUTION EPIDURAL; INTRACAUDAL
Status: DISCONTINUED | OUTPATIENT
Start: 2023-07-06 | End: 2023-07-06 | Stop reason: HOSPADM

## 2023-07-06 RX ADMIN — ALPRAZOLAM 0.5 MG: 0.5 TABLET, ORALLY DISINTEGRATING ORAL at 02:07

## 2023-07-06 NOTE — OP NOTE
PROCEDURE DATE: 7/6/2023    PROCEDURE: Bilateral L4/5 transforaminal epidural steroid injection under fluoroscopy    DIAGNOSIS: Lumbar  Radiculopathy    Post op diagnosis: Same    PHYSICIAN: Delmer Genao MD    MEDICATIONS INJECTED:  Methylprednisolone 40mg (1ml) and 1ml 0.25% bupivicaine at each nerve root.     LOCAL ANESTHETIC INJECTED:  Lidocaine 1%. 4 ml per site.    SEDATION MEDICATIONS: none    ESTIMATED BLOOD LOSS:  none    COMPLICATIONS:  none    TECHNIQUE:   A time-out was taken to identify patient and procedure side prior to starting the procedure. The patient was placed in a prone position, prepped and draped in the usual sterile fashion using ChloraPrep and sterile towels.  The area to be injected was determined under fluoroscopic guidance in AP and oblique view.  Local anesthetic was given by raising a wheal and going down to the hub of a 25-gauge 1.5 inch needle.  In oblique view, a 3.5 inch 22-gauge bent-tip spinal needle was introduced towards 6 oclock position of the pedicle of each above named nerve root level.  The needle was walked medially then hinged into the neural foramen and position was confirmed in AP and lateral views.  Omnipaque contrast dye was injected to confirm appropriate placement and that there was no vascular uptake.  After negative aspiration for blood or CSF, the medication was then injected. This was performed at the right and then left L4/5 level(s). The patient tolerated the procedure well.    The patient was monitored after the procedure.  Patient was given post procedure and discharge instructions to follow at home. The patient was discharged in a stable condition.

## 2023-08-31 ENCOUNTER — OFFICE VISIT (OUTPATIENT)
Dept: PAIN MEDICINE | Facility: CLINIC | Age: 78
End: 2023-08-31
Payer: MEDICARE

## 2023-08-31 VITALS
BODY MASS INDEX: 28.16 KG/M2 | HEIGHT: 62 IN | WEIGHT: 153 LBS | SYSTOLIC BLOOD PRESSURE: 162 MMHG | HEART RATE: 73 BPM | DIASTOLIC BLOOD PRESSURE: 70 MMHG

## 2023-08-31 DIAGNOSIS — M48.061 SPINAL STENOSIS OF LUMBAR REGION WITHOUT NEUROGENIC CLAUDICATION: ICD-10-CM

## 2023-08-31 DIAGNOSIS — M47.816 LUMBAR SPONDYLOSIS: Primary | ICD-10-CM

## 2023-08-31 DIAGNOSIS — M54.16 LUMBAR RADICULOPATHY: ICD-10-CM

## 2023-08-31 DIAGNOSIS — M51.36 DDD (DEGENERATIVE DISC DISEASE), LUMBAR: ICD-10-CM

## 2023-08-31 PROCEDURE — 3078F DIAST BP <80 MM HG: CPT | Mod: CPTII,S$GLB,,

## 2023-08-31 PROCEDURE — 1159F MED LIST DOCD IN RCRD: CPT | Mod: CPTII,S$GLB,,

## 2023-08-31 PROCEDURE — 99214 OFFICE O/P EST MOD 30 MIN: CPT | Mod: S$GLB,,,

## 2023-08-31 PROCEDURE — 99214 PR OFFICE/OUTPT VISIT, EST, LEVL IV, 30-39 MIN: ICD-10-PCS | Mod: S$GLB,,,

## 2023-08-31 PROCEDURE — 99999 PR PBB SHADOW E&M-EST. PATIENT-LVL III: ICD-10-PCS | Mod: PBBFAC,,,

## 2023-08-31 PROCEDURE — 3077F PR MOST RECENT SYSTOLIC BLOOD PRESSURE >= 140 MM HG: ICD-10-PCS | Mod: CPTII,S$GLB,,

## 2023-08-31 PROCEDURE — 99999 PR PBB SHADOW E&M-EST. PATIENT-LVL III: CPT | Mod: PBBFAC,,,

## 2023-08-31 PROCEDURE — 1125F PR PAIN SEVERITY QUANTIFIED, PAIN PRESENT: ICD-10-PCS | Mod: CPTII,S$GLB,,

## 2023-08-31 PROCEDURE — 3077F SYST BP >= 140 MM HG: CPT | Mod: CPTII,S$GLB,,

## 2023-08-31 PROCEDURE — 3078F PR MOST RECENT DIASTOLIC BLOOD PRESSURE < 80 MM HG: ICD-10-PCS | Mod: CPTII,S$GLB,,

## 2023-08-31 PROCEDURE — 1159F PR MEDICATION LIST DOCUMENTED IN MEDICAL RECORD: ICD-10-PCS | Mod: CPTII,S$GLB,,

## 2023-08-31 PROCEDURE — 1125F AMNT PAIN NOTED PAIN PRSNT: CPT | Mod: CPTII,S$GLB,,

## 2023-08-31 RX ORDER — SODIUM CHLORIDE, SODIUM LACTATE, POTASSIUM CHLORIDE, CALCIUM CHLORIDE 600; 310; 30; 20 MG/100ML; MG/100ML; MG/100ML; MG/100ML
INJECTION, SOLUTION INTRAVENOUS CONTINUOUS
Status: CANCELLED | OUTPATIENT
Start: 2023-08-31

## 2023-08-31 NOTE — PROGRESS NOTES
This note was completed with dictation software and grammatical errors may exist.    CC:  Back pain, left leg pain    HPI:  The patient is a 77-year-old woman with a history of hypertension, CAD on Plavix, history of lumbar surgery who presents in referral from [facility] Neurosurgery for back pain and left leg pain.  She is status post bilateral L4/5 transforaminal epidural steroid injection on 07/06/2023 with 40% relief.  Today she is reporting continued lower back pain, 7-10/10, located across her lower back, worsened with physical activities.  She denies any pain radiating into her legs nor any associated numbness, weakness, or any changes to her bowel bladder function.      Pain intervention history:  She has done some physical therapy in [place] where she lives without much relief.  She has been taking gabapentin 100 milligrams twice daily with no major relief.  She is status post left L3/4 and L4/5 transforaminal injection on 12/27/2019 with 50% relief of her back pain but no improvement in her left leg pain.  She is status post right 3/4 and L4/5 transforaminal epidural steroid injection on 01/28/2020 with 75% relief.  Has had GTB injections with no major relief.She is status post right 3/4 and L4/5 transforaminal epidural steroid injection on 01/28/2020 with 75% relief.    She is status post bilateral L4/5 transforaminal epidural steroid injections on 06/22/2021 with 80% relief.   She is status post bilateral L4/5 transforaminal epidural steroid injections on 06/06/2022 with 100% relief of her back and leg pain.  She is status post C7-T1 interlaminar epidural steroid injection on 07/01/2022 with 100% relief of her neck pain. She had seen Dr. Jessenia osorio for her right hip and was sent for an intra-articular injection on 05/11/2022 with unclear benefit.  He did bilateral trochanteric bursa injections on 02/27/2023 which seem to help for about a week or 2 but only slightly. She is status post  bilateral L4/5 transforaminal epidural steroid injections in 05/05/2023 with 50% relief. She is status post bilateral L4/5 transforaminal epidural steroid injection on 07/06/2023 with 40% relief.     Spine surgeries:  Lumbar surgery with Dr. Gordon, likely left L4/5 hemilaminectomy    Antineuropathics:  Gabapentin 300 3 times daily unclear benefit  NSAIDs:  Mobic 15  Physical therapy:  Antidepressants:  Muscle relaxers:  Opioids:  Antiplatelets/Anticoagulants:  Plavix, aspirin      ROS:  She reports back pain only.  Balance of review of systems is negative.    Past Medical History:   Diagnosis Date    Anticoagulant long-term use     CAD (coronary artery disease)     cabg x 4 2007; stents    DDD (degenerative disc disease), lumbar     Encounter for blood transfusion     Hypertension     AUDREY on CPAP     PVD (peripheral vascular disease)     Thyroid disease        Past Surgical History:   Procedure Laterality Date    ARTERIOGRAPHY OF AORTIC ROOT N/A 4/8/2022    Procedure: ARTERIOGRAM, AORTIC ROOT;  Surgeon: Miguel Ross III, MD;  [facility];  Service: Cardiology;  Laterality: N/A;    BACK SURGERY      lumbar laminec    CARDIAC SURGERY  2007    x 4 vessels    CAROTID ENDARTERECTOMY Bilateral     each side done twice    CORONARY ANGIOPLASTY WITH STENT PLACEMENT      CORONARY ARTERY BYPASS GRAFT      CORONARY BYPASS GRAFT ANGIOGRAPHY  4/8/2022    Procedure: Bypass graft study;  Surgeon: Miguel Ross III, MD;  Location: [facility];  Service: Cardiology;;    EPIDURAL STEROID INJECTION INTO CERVICAL SPINE N/A 7/1/2022    Procedure: Injection-steroid-epidural-cervical;  Surgeon: Delmer Genao MD;  Location: [facility] OR;  Service: Pain Management;  Laterality: N/A;    HYSTERECTOMY      LEFT HEART CATHETERIZATION Left 4/8/2022    Procedure: CATHETERIZATION, HEART, LEFT;  Surgeon: Miguel Ross III, MD;  Location[facility];  Service: Cardiology;  Laterality: Left;    PERIPHERAL ANGIOGRAPHY N/A 3/9/2021    Procedure:  ANGIOGRAM, PERIPHERAL;  Surgeon: Miguel Goldsmith MD;  Location: [facility] CATH;  Service: Cardiology;  Laterality: N/A;    REVISION AMPUTATION OF FINGER Left     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      TRANSFORAMINAL EPIDURAL INJECTION OF STEROID Left 12/27/2019    Procedure: Injection,steroid,epidural,transforaminal approach L3/4 and L4/5;  Surgeon: Delmer Genao MD;  Location: [facility] OR;  Service: Pain Management;  Laterality: Left;    TRANSFORAMINAL EPIDURAL INJECTION OF STEROID Right 1/28/2020    Procedure: Injection,steroid,epidural,transforaminal approach L3/4 and L4/5;  Surgeon: Delmer Genao MD;  Location: [facility] OR;  Service: Pain Management;  Laterality: Right;    TRANSFORAMINAL EPIDURAL INJECTION OF STEROID Bilateral 6/22/2021    Procedure: Injection,steroid,epidural,transforaminal approach L4/5;  Surgeon: Delmer Genao MD;  Location: [facility] OR;  Service: Pain Management;  Laterality: Bilateral;    TRANSFORAMINAL EPIDURAL INJECTION OF STEROID Bilateral 6/6/2022    Procedure: Injection,steroid,epidural,transforaminal approach L4/5;  Surgeon: Delmer Genao MD;  Location: [facility] OR;  Service: Pain Management;  Laterality: Bilateral;    TRANSFORAMINAL EPIDURAL INJECTION OF STEROID Bilateral 5/5/2023    Procedure: Injection,steroid,epidural,transforaminal approach L4/5;  Surgeon: Delmer Genao MD;  Location: [facility] OR;  Service: Pain Management;  Laterality: Bilateral;    TRANSFORAMINAL EPIDURAL INJECTION OF STEROID Bilateral 7/6/2023    Procedure: Injection,steroid,epidural,transforaminal approach L4/5;  Surgeon: Delmer Genao MD;  Location: [facility] OR;  Service: Pain Management;  Laterality: Bilateral;    TRIGGER FINGER RELEASE Left 6/21/2018    Procedure: RELEASE-FINGER-TRIGGER-LEFT RING;  Surgeon: Norbert Ruelas MD;  Location: [facility] OR;  Service: Orthopedics;  Laterality: Left;    VASCULAR SURGERY         Social History     Socioeconomic History    Marital status:   "  Tobacco Use    Smoking status: Former     Current packs/day: 0.00     Average packs/day: 1.5 packs/day for 40.0 years (60.0 ttl pk-yrs)     Types: Cigarettes     Start date: 1967     Quit date: 2007     Years since quittin.1    Smokeless tobacco: Never   Substance and Sexual Activity    Alcohol use: Not Currently     Alcohol/week: 2.0 standard drinks of alcohol     Types: 2 Glasses of wine per week     Comment: socially    Drug use: No         Medications/Allergies: See med card    Vitals:    23 1357   BP: (!) 162/70   Pulse: 73   Weight: 69.4 kg (153 lb)   Height: 5' 2" (1.575 m)   PainSc:   7   PainLoc: Back     Body mass index is 27.98 kg/m².        Physical exam:  Gen: A and O x3, pleasant, well-groomed  Skin: No rashes or obvious lesions  HEENT: PERRLA, no obvious deformities on ears or in canals.Trachea midline.  CVS: Regular rate and rhythm, normal palpable pulses.  Resp: Clear to auscultation bilaterally, no wheezes or rales.  Abdomen: Soft, NT/ND.  Musculoskeletal: Able to heel walk, toe walk. No antalgic gait.     Neuro:  Motor:      Iliopsoas Quadriceps Knee  Flexion Tibialis  anterior Gastro- cnemius EHL   Lower: R 5/5 5/5 5/5 5/5 5/5 5/5    L 5/5 5/5 5/5 5/5 5/5 5/5        Left  Right    Triceps DTR     Biceps DTR     Brachioradialis DTR     Patellar DTR 2+ 2+   Achilles DTR 2+ 2+   Roberts Absent  Absent   Clonus Absent Absent   Babinski Absent Absent     Sensory:  Intact and symmetrical to light touch and pinprick in L1-S1 dermatomes bilaterally.    Lumbar spine:  Lumbar spine: ROM is full with flexion extension and oblique extension with increased pain.    Adair's test causes no increased pain on either side.    Supine straight leg raise is negative bilaterally.    Internal and external rotation of the hip causes no increased pain on either side.  Myofascial exam: No tenderness to palpation across lumbar paraspinous muscles.      Imagin19 MRI L-spine:  T12-L1: There " is right facet joint arthropathy with ligamentum flavum thickening contributing to mild right foraminal stenosis.  There is no spinal stenosis.  L1-2: There is bilateral, left greater than right, facet joint arthropathy with ligamentum flavum thickening and a minimal disc bulge.  There is mild left foraminal stenosis without spinal stenosis.  L2-3: There is marked disc space narrowing.  There is a disc bulge with osteophytic ridging and superimposed broad left paracentral disc protrusion with annular fissure.  There is facet joint arthropathy.  There is no spinal stenosis.  There is mild bilateral foraminal stenosis.  L3-4: There is moderate disc space narrowing.  There is a disc bulge with osteophytic ridging but there's a superimposed left paracentral and foraminal disc extrusion in addition to moderate facet joint arthropathy.  There is no significant spinal stenosis but there is severe left lateral recess and foraminal stenosis with mild-to-moderate right foraminal stenosis.  L4-5: There is marked disc space narrowing.  There's been left laminectomy.  There's right greater than left facet joint arthropathy.  There is a diffuse disc bulge with osteophytic ridging and superimposed large left paracentral disc extrusion contributing to moderate spinal stenosis, severe left and moderate right lateral recess stenosis as well as moderate to severe left and moderate right foraminal stenosis.  L5-S1: There is right greater than left facet joint arthropathy.  There's a mild disc bulge.  There is no significant spinal stenosis.  There is moderate right and mild left foraminal stenosis.    12/11/19 Xray L-spine:  There is a minimal rotary levocurvature of the lower lumbar spine.  There is multilevel degenerative disc and facet disease.  There is no fracture or malalignment and there is no abnormal excursion with flexion or extension.  There's marked disc space narrowing at the L2-3 through L4-5 levels.  There is multilevel  facet joint arthropathy, most severe at the L5-S1 level.  There is moderate atherosclerosis.    4/19/22 Xray bilateral hips:  Moderate bilateral femoroacetabular osteoarthritis.    Assessment:   The patient is a 77-year-old woman with a history of hypertension, CAD on Plavix, history of lumbar surgery who presents in referral from [facility] for back pain and left leg pain.     1. Lumbar spondylosis        2. Lumbar radiculopathy        3. DDD (degenerative disc disease), lumbar        4. Spinal stenosis of lumbar region without neurogenic claudication              Plan:  Unfortunately, she did not find complete relief with most recent repeat bilateral L4/5 transforaminal ANAMARIA.  Previous MRI is significant for significant central disc extrusion however this was from 2019.  At this time, without complete relief with 2 epidurals I believe some of her remaining pain could be axial facet mediated pain.  For her pain I would like to schedule her for bilateral L4/5 and L5/S1 diagnostic medial branch blocks.  If successful we will repeat the blocks prior to proceeding with radiofrequency ablation.  If she fails to get relief with diagnostic medial branch blocks will order an updated lumbar MRI.

## 2023-09-14 ENCOUNTER — TELEPHONE (OUTPATIENT)
Dept: SURGERY | Facility: HOSPITAL | Age: 78
End: 2023-09-14
Payer: MEDICARE

## 2023-09-14 ENCOUNTER — HOSPITAL ENCOUNTER (OUTPATIENT)
Dept: RADIOLOGY | Facility: HOSPITAL | Age: 78
Discharge: HOME | End: 2023-09-14
Attending: ANESTHESIOLOGY | Admitting: ANESTHESIOLOGY
Payer: MEDICARE

## 2023-09-14 DIAGNOSIS — M54.50 LOWER BACK PAIN: ICD-10-CM

## 2023-09-14 NOTE — TELEPHONE ENCOUNTER
Please reach out to patient to reschedule lumbar block that was scheduled for today (9/14). Pt's blood pressure was elevated and MD wishes to reschedule. Thank you

## 2023-09-19 NOTE — TELEPHONE ENCOUNTER
Please make sure she has followed up with her primary care physician to deal with her blood pressure issues, once this is done she can reschedule with us for the procedure

## 2023-09-20 NOTE — TELEPHONE ENCOUNTER
Spoke with patient, states she has not made an appointment with her PCP yet but she will soon and she will call us to reschedule her procedure after she sees her PCP.

## 2024-01-25 ENCOUNTER — TELEPHONE (OUTPATIENT)
Dept: RESEARCH | Facility: OTHER | Age: 79
End: 2024-01-25
Payer: MEDICARE

## 2024-01-25 NOTE — TELEPHONE ENCOUNTER
Study Title: Transcending COVID-19 barriers to pain care in [place]: Pragmatic comparative effectiveness trial of evidence-based, on-demand, digital behavioral treatments for chronic pain.  Sponsor:  [facility]   Study: [facility] Digital Pain Treatment Study - Transcending COVID-19 barriers to pain care in [place]: Pragmatic comparative effectiveness trial of evidence-based, on-demand, digital behavioral treatments for chronic pain.   IRB/Protocol #: 2021.177 - Phase 2?  Study#([facility]):  [id number]  Principle Investigator - Jesse Romero   Sub-Investigator - Bobby Mcrae   Sponsor:  [facility] Screening Interest in Study Call    Attempt #: 1, 2, 3+: 1      1. Contact Made: [x]Yes []No   1A. If yes, contact date: [id number]  1B. If no, date of final contact attempt: [id number]  1C. If no, reason(s) contact not made:  []Wrong number []No response []Other   1D. If other, please specify: not interested    2. Verbal commitment: []Yes  [x]No  2A. If yes, verbal commitment date:   2B. If no, reason: []Exclusion Criteria Met  []Desire not to participate []No reason provided []Other  2B1. If Type of Exclusion Criteria Met or other reason, specify: not interested    I called & spoke with the subject about this research study. The subject states that she is not interested in participating in this study.

## 2025-04-21 ENCOUNTER — TELEPHONE (OUTPATIENT)
Dept: PAIN MEDICINE | Facility: CLINIC | Age: 80
End: 2025-04-21

## 2025-04-21 ENCOUNTER — OFFICE VISIT (OUTPATIENT)
Dept: PAIN MEDICINE | Facility: CLINIC | Age: 80
End: 2025-04-21
Payer: MEDICARE

## 2025-04-21 VITALS
BODY MASS INDEX: 28.63 KG/M2 | SYSTOLIC BLOOD PRESSURE: 146 MMHG | HEART RATE: 63 BPM | DIASTOLIC BLOOD PRESSURE: 64 MMHG | WEIGHT: 156.5 LBS

## 2025-04-21 DIAGNOSIS — M48.061 SPINAL STENOSIS OF LUMBAR REGION WITHOUT NEUROGENIC CLAUDICATION: ICD-10-CM

## 2025-04-21 DIAGNOSIS — M47.816 LUMBAR SPONDYLOSIS: Primary | ICD-10-CM

## 2025-04-21 DIAGNOSIS — M51.360 DEGENERATION OF INTERVERTEBRAL DISC OF LUMBAR REGION WITH DISCOGENIC BACK PAIN: ICD-10-CM

## 2025-04-21 PROCEDURE — 99214 OFFICE O/P EST MOD 30 MIN: CPT | Mod: S$GLB,,, | Performed by: ANESTHESIOLOGY

## 2025-04-21 PROCEDURE — 3288F FALL RISK ASSESSMENT DOCD: CPT | Mod: CPTII,S$GLB,, | Performed by: ANESTHESIOLOGY

## 2025-04-21 PROCEDURE — 1125F AMNT PAIN NOTED PAIN PRSNT: CPT | Mod: CPTII,S$GLB,, | Performed by: ANESTHESIOLOGY

## 2025-04-21 PROCEDURE — 99999 PR PBB SHADOW E&M-EST. PATIENT-LVL III: CPT | Mod: PBBFAC,,, | Performed by: ANESTHESIOLOGY

## 2025-04-21 PROCEDURE — 1101F PT FALLS ASSESS-DOCD LE1/YR: CPT | Mod: CPTII,S$GLB,, | Performed by: ANESTHESIOLOGY

## 2025-04-21 PROCEDURE — 3078F DIAST BP <80 MM HG: CPT | Mod: CPTII,S$GLB,, | Performed by: ANESTHESIOLOGY

## 2025-04-21 PROCEDURE — 1159F MED LIST DOCD IN RCRD: CPT | Mod: CPTII,S$GLB,, | Performed by: ANESTHESIOLOGY

## 2025-04-21 PROCEDURE — 3077F SYST BP >= 140 MM HG: CPT | Mod: CPTII,S$GLB,, | Performed by: ANESTHESIOLOGY

## 2025-04-21 RX ORDER — SODIUM CHLORIDE, SODIUM LACTATE, POTASSIUM CHLORIDE, CALCIUM CHLORIDE 600; 310; 30; 20 MG/100ML; MG/100ML; MG/100ML; MG/100ML
INJECTION, SOLUTION INTRAVENOUS CONTINUOUS
OUTPATIENT
Start: 2025-04-21

## 2025-04-21 NOTE — H&P (VIEW-ONLY)
This note was completed with dictation software and grammatical errors may exist.    Chief Complaint   Patient presents with    Back Pain       HPI:  The patient is a 79-year-old woman with a history of hypertension, CAD on Plavix, history of lumbar surgery who presents in referral from [facility], Neurosurgery for back pain and left leg pain.  Returns in follow-up today for back pain.  She has been having some bilateral hip pain, recently underwent bilateral trochanteric bursa injections on 04/14/2025.  I had last seen her over a year ago, at that time she was having back pain and we set her up for bilateral L4/5 and L5/S1 medial branch blocks.  However when she arrived she had significant hypertension and so we canceled the procedure.  History of Present Illness    Ms. Everett presents with persistent bilateral low back pain extending up to the bra line. Pain is exacerbated by standing and walking, while sitting provides relief. She has difficulty with daily activities, including vacuuming and moving clothes between rooms due to severe back pain.    She reports intermittent leg symptoms, primarily in the morning. She experiences a sensation similar to being tased in both legs equally when reaching into cabinets, which subsides within seconds. She denies constant leg pain throughout the day.    She recently received hip bursa injections, which were helpful. Last year, a carotid artery stent was placed following an ultrasound, though she was asymptomatic at the time. She mentions having had dental ulcers on both sides, each treated twice, with stent clogging due to scar tissue.    Ms. Everett recently received hip bursa injections, which provided benefit.      ROS:  Musculoskeletal: +back pain, +pain with movement  Neurological: +numbness, +tingling               Pain intervention history:  She has done some physical therapy in [facility] where she lives without much relief.  She has been taking gabapentin  100 milligrams twice daily with no major relief.  She is status post left L3/4 and L4/5 transforaminal injection on 12/27/2019 with 50% relief of her back pain but no improvement in her left leg pain.  She is status post right 3/4 and L4/5 transforaminal epidural steroid injection on 01/28/2020 with 75% relief.  Has had GTB injections with no major relief.She is status post right 3/4 and L4/5 transforaminal epidural steroid injection on 01/28/2020 with 75% relief.    She is status post bilateral L4/5 transforaminal epidural steroid injections on 06/22/2021 with 80% relief.   She is status post bilateral L4/5 transforaminal epidural steroid injections on 06/06/2022 with 100% relief of her back and leg pain.  She is status post C7-T1 interlaminar epidural steroid injection on 07/01/2022 with 100% relief of her neck pain. She had seen Dr. Jessenia osorio for her right hip and was sent for an intra-articular injection on 05/11/2022 with unclear benefit.  He did bilateral trochanteric bursa injections on 02/27/2023 which seem to help for about a week or 2 but only slightly. She is status post bilateral L4/5 transforaminal epidural steroid injections in 05/05/2023 with 50% relief. She is status post bilateral L4/5 transforaminal epidural steroid injection on 07/06/2023 with 40% relief.     Spine surgeries:  Lumbar surgery with Dr. Gordon, likely left L4/5 hemilaminectomy    Antineuropathics:  Gabapentin 300 3 times daily unclear benefit  NSAIDs:  Mobic 15  Physical therapy:  Antidepressants:  Muscle relaxers:  Opioids:  Antiplatelets/Anticoagulants:  Plavix, aspirin      ROS:  She reports back pain only.  Balance of review of systems is negative.    Past Medical History:   Diagnosis Date    Anticoagulant long-term use     CAD (coronary artery disease)     cabg x 4 2007; stents    Carotid artery stenosis     DDD (degenerative disc disease), lumbar     Diabetes mellitus     Encounter for blood transfusion     GERD  (gastroesophageal reflux disease)     Hypertension     AUDREY on CPAP     No CPAP    PVD (peripheral vascular disease)     Thyroid disease        Past Surgical History:   Procedure Laterality Date    ARTERIOGRAPHY OF AORTIC ROOT N/A 04/08/2022    Procedure: ARTERIOGRAM, AORTIC ROOT;  Surgeon: Miguel Ross III, MD;  Location: [facility] CATH;  Service: Cardiology;  Laterality: N/A;    BACK SURGERY      lumbar laminec    CARDIAC SURGERY  2007    x 4 vessels    CAROTID ENDARTERECTOMY Bilateral     each side done twice    CORONARY ANGIOPLASTY WITH STENT PLACEMENT      CORONARY ARTERY BYPASS GRAFT      CORONARY BYPASS GRAFT ANGIOGRAPHY  04/08/2022    Procedure: Bypass graft study;  Surgeon: Miguel Ross III, MD;  Location: [facility] CATH;  Service: Cardiology;;    EPIDURAL STEROID INJECTION INTO CERVICAL SPINE N/A 07/01/2022    Procedure: Injection-steroid-epidural-cervical;  Surgeon: Delmer Genao MD;  Location: [facility] OR;  Service: Pain Management;  Laterality: N/A;    EYE SURGERY Bilateral     cataracts    HYSTERECTOMY      INSERTION OF STENT INTO PERIPHERAL VESSEL  5/16/2024    Procedure: Lt. Subclavian stent placement;  Surgeon: Blayne Anthony MD;  Location: [facility] CATH;  Service: Cardiology;;    LEFT HEART CATHETERIZATION Left 04/08/2022    Procedure: CATHETERIZATION, HEART, LEFT;  Surgeon: Miguel Ross III, MD;  Location: [facility] CATH;  Service: Cardiology;  Laterality: Left;    PERCUTANEOUS TRANSLUMINAL ANGIOPLASTY  5/16/2024    Procedure: PTA Lt. Subclavian artery;  Surgeon: Blayne Anthony MD;  Location: [facility] CATH;  Service: Cardiology;;    PERIPHERAL ANGIOGRAPHY N/A 03/09/2021    Procedure: ANGIOGRAM, PERIPHERAL;  Surgeon: Miguel Goldsmith MD;  Location: [facility] CATH;  Service: Cardiology;  Laterality: N/A;    PERIPHERAL ANGIOGRAPHY  5/16/2024    Procedure: Lt. Subclavian angiogram;  Surgeon: Blayne Anthony MD;  Location: [facility] CATH;  Service: Cardiology;;    REVISION AMPUTATION OF FINGER Left     tip of left small finger  traumatic amputation.      TRANSFORAMINAL EPIDURAL INJECTION OF STEROID Left 12/27/2019    Procedure: Injection,steroid,epidural,transforaminal approach L3/4 and L4/5;  Surgeon: Delmer Genao MD;  Location: [facility] OR;  Service: Pain Management;  Laterality: Left;    TRANSFORAMINAL EPIDURAL INJECTION OF STEROID Right 01/28/2020    Procedure: Injection,steroid,epidural,transforaminal approach L3/4 and L4/5;  Surgeon: Delmer Genao MD;  Location: [facility] OR;  Service: Pain Management;  Laterality: Right;    TRANSFORAMINAL EPIDURAL INJECTION OF STEROID Bilateral 06/22/2021    Procedure: Injection,steroid,epidural,transforaminal approach L4/5;  Surgeon: Delmer Genao MD;  Location: [facility] OR;  Service: Pain Management;  Laterality: Bilateral;    TRANSFORAMINAL EPIDURAL INJECTION OF STEROID Bilateral 06/06/2022    Procedure: Injection,steroid,epidural,transforaminal approach L4/5;  Surgeon: Delmer Genao MD;  Location: [facility] OR;  Service: Pain Management;  Laterality: Bilateral;    TRANSFORAMINAL EPIDURAL INJECTION OF STEROID Bilateral 05/05/2023    Procedure: Injection,steroid,epidural,transforaminal approach L4/5;  Surgeon: Delmer Genao MD;  Location: [facility] OR;  Service: Pain Management;  Laterality: Bilateral;    TRANSFORAMINAL EPIDURAL INJECTION OF STEROID Bilateral 07/06/2023    Procedure: Injection,steroid,epidural,transforaminal approach L4/5;  Surgeon: Delmer Genao MD;  Location: [facility] OR;  Service: Pain Management;  Laterality: Bilateral;    TRIGGER FINGER RELEASE Left 06/21/2018    Procedure: RELEASE-FINGER-TRIGGER-LITTLE RING;  Surgeon: Norbert Ruelas MD;  Location: [facility] OR;  Service: Orthopedics;  Laterality: Left;    VASCULAR SURGERY         Social History     Socioeconomic History    Marital status:    Tobacco Use    Smoking status: Former     Current packs/day: 0.00     Average packs/day: 1.5 packs/day for 40.0 years (60.0 ttl pk-yrs)     Types: Cigarettes     Start date:  1967     Quit date: 2007     Years since quittin.8    Smokeless tobacco: Never   Substance and Sexual Activity    Alcohol use: Not Currently     Alcohol/week: 2.0 standard drinks of alcohol     Types: 2 Glasses of wine per week     Comment: socially, very rarely    Drug use: No         Medications/Allergies: See med card    Vitals:    25 1028   BP: (!) 146/64   Pulse: 63   Weight: 71 kg (156 lb 8.4 oz)   PainSc:   5   PainLoc: Back     Body mass index is 28.63 kg/m².        Physical exam:  Gen: A and O x3, pleasant, well-groomed  Musculoskeletal: Able to heel walk, toe walk. No antalgic gait.     Neuro:  Motor:      Iliopsoas Quadriceps Knee  Flexion Tibialis  anterior Gastro- cnemius EHL   Lower: R 5/5 5/5 5/5 5/5 5/5 5/5    L 5/5 5/5 5/5 5/5 5/5 5/5        Left  Right    Triceps DTR     Biceps DTR     Brachioradialis DTR     Patellar DTR 2+ 2+   Achilles DTR 2+ 2+   Roberts Absent  Absent   Clonus Absent Absent   Babinski Absent Absent     Sensory:  Intact and symmetrical to light touch and pinprick in L1-S1 dermatomes bilaterally.    Lumbar spine:  Lumbar spine: ROM is full with flexion extension and oblique extension with increased pain especially on extension.    Adair's test causes no increased pain on either side.    Supine straight leg raise is negative bilaterally.    Internal and external rotation of the hip causes no increased pain on either side.  Myofascial exam: No tenderness to palpation across lumbar paraspinous muscles.      Imagin19 MRI L-spine:  T12-L1: There is right facet joint arthropathy with ligamentum flavum thickening contributing to mild right foraminal stenosis.  There is no spinal stenosis.  L1-2: There is bilateral, left greater than right, facet joint arthropathy with ligamentum flavum thickening and a minimal disc bulge.  There is mild left foraminal stenosis without spinal stenosis.  L2-3: There is marked disc space narrowing.  There is a disc bulge  with osteophytic ridging and superimposed broad left paracentral disc protrusion with annular fissure.  There is facet joint arthropathy.  There is no spinal stenosis.  There is mild bilateral foraminal stenosis.  L3-4: There is moderate disc space narrowing.  There is a disc bulge with osteophytic ridging but there's a superimposed left paracentral and foraminal disc extrusion in addition to moderate facet joint arthropathy.  There is no significant spinal stenosis but there is severe left lateral recess and foraminal stenosis with mild-to-moderate right foraminal stenosis.  L4-5: There is marked disc space narrowing.  There's been left laminectomy.  There's right greater than left facet joint arthropathy.  There is a diffuse disc bulge with osteophytic ridging and superimposed large left paracentral disc extrusion contributing to moderate spinal stenosis, severe left and moderate right lateral recess stenosis as well as moderate to severe left and moderate right foraminal stenosis.  L5-S1: There is right greater than left facet joint arthropathy.  There's a mild disc bulge.  There is no significant spinal stenosis.  There is moderate right and mild left foraminal stenosis.    12/11/19 Xray L-spine:  There is a minimal rotary levocurvature of the lower lumbar spine.  There is multilevel degenerative disc and facet disease.  There is no fracture or malalignment and there is no abnormal excursion with flexion or extension.  There's marked disc space narrowing at the L2-3 through L4-5 levels.  There is multilevel facet joint arthropathy, most severe at the L5-S1 level.  There is moderate atherosclerosis.    4/19/22 Xray bilateral hips:  Moderate bilateral femoroacetabular osteoarthritis.    Assessment:   The patient is a 79-year-old woman with a history of hypertension, CAD on Plavix, history of lumbar surgery who presents in referral from [facility] for back pain and left leg pain.     1. Lumbar  spondylosis        2. Spinal stenosis of lumbar region without neurogenic claudication        3. Degeneration of intervertebral disc of lumbar region with discogenic back pain                Plan:  Assessment & Plan    LUMBAR SPINAL STENOSIS AND FACET JOINT PAIN:  1. Recommend medial branch blocks for facet joint pain, bilateral L4/5 and L5/S1  4. If successful, r would repeat 1 more time and then diofrequency ablation considered for longer-term relief.  5. Ms. MariaMarguerite to perform normal activities after procedure to assess pain relief.  6. Perform normal activities that typically cause pain (e.g., cooking, cleaning, vacuuming) after the diagnostic block to assess effectiveness.  We will call her the day after a procedure to determine effectiveness.

## 2025-04-21 NOTE — TELEPHONE ENCOUNTER
Spoke with patient and scheduled.Patient reports that she does not take Ozempic anymore. Pre op information given to patient.

## 2025-04-21 NOTE — PROGRESS NOTES
This note was completed with dictation software and grammatical errors may exist.    Chief Complaint   Patient presents with    Back Pain       HPI:  The patient is a 79-year-old woman with a history of hypertension, CAD on Plavix, history of lumbar surgery who presents in referral from [facility], Neurosurgery for back pain and left leg pain.  Returns in follow-up today for back pain.  She has been having some bilateral hip pain, recently underwent bilateral trochanteric bursa injections on 04/14/2025.  I had last seen her over a year ago, at that time she was having back pain and we set her up for bilateral L4/5 and L5/S1 medial branch blocks.  However when she arrived she had significant hypertension and so we canceled the procedure.  History of Present Illness    Ms. Everett presents with persistent bilateral low back pain extending up to the bra line. Pain is exacerbated by standing and walking, while sitting provides relief. She has difficulty with daily activities, including vacuuming and moving clothes between rooms due to severe back pain.    She reports intermittent leg symptoms, primarily in the morning. She experiences a sensation similar to being tased in both legs equally when reaching into cabinets, which subsides within seconds. She denies constant leg pain throughout the day.    She recently received hip bursa injections, which were helpful. Last year, a carotid artery stent was placed following an ultrasound, though she was asymptomatic at the time. She mentions having had dental ulcers on both sides, each treated twice, with stent clogging due to scar tissue.    Ms. Everett recently received hip bursa injections, which provided benefit.      ROS:  Musculoskeletal: +back pain, +pain with movement  Neurological: +numbness, +tingling               Pain intervention history:  She has done some physical therapy in [place] where she lives without much relief.  She has been taking gabapentin  100 milligrams twice daily with no major relief.  She is status post left L3/4 and L4/5 transforaminal injection on 12/27/2019 with 50% relief of her back pain but no improvement in her left leg pain.  She is status post right 3/4 and L4/5 transforaminal epidural steroid injection on 01/28/2020 with 75% relief.  Has had GTB injections with no major relief.She is status post right 3/4 and L4/5 transforaminal epidural steroid injection on 01/28/2020 with 75% relief.    She is status post bilateral L4/5 transforaminal epidural steroid injections on 06/22/2021 with 80% relief.   She is status post bilateral L4/5 transforaminal epidural steroid injections on 06/06/2022 with 100% relief of her back and leg pain.  She is status post C7-T1 interlaminar epidural steroid injection on 07/01/2022 with 100% relief of her neck pain. She had seen Dr. Jessenia osorio for her right hip and was sent for an intra-articular injection on 05/11/2022 with unclear benefit.  He did bilateral trochanteric bursa injections on 02/27/2023 which seem to help for about a week or 2 but only slightly. She is status post bilateral L4/5 transforaminal epidural steroid injections in 05/05/2023 with 50% relief. She is status post bilateral L4/5 transforaminal epidural steroid injection on 07/06/2023 with 40% relief.     Spine surgeries:  Lumbar surgery with Dr. Gordon, likely left L4/5 hemilaminectomy    Antineuropathics:  Gabapentin 300 3 times daily unclear benefit  NSAIDs:  Mobic 15  Physical therapy:  Antidepressants:  Muscle relaxers:  Opioids:  Antiplatelets/Anticoagulants:  Plavix, aspirin      ROS:  She reports back pain only.  Balance of review of systems is negative.    Past Medical History:   Diagnosis Date    Anticoagulant long-term use     CAD (coronary artery disease)     cabg x 4 2007; stents    Carotid artery stenosis     DDD (degenerative disc disease), lumbar     Diabetes mellitus     Encounter for blood transfusion     GERD  (gastroesophageal reflux disease)     Hypertension     AUDREY on CPAP     No CPAP    PVD (peripheral vascular disease)     Thyroid disease        Past Surgical History:   Procedure Laterality Date    ARTERIOGRAPHY OF AORTIC ROOT N/A 04/08/2022    Procedure: ARTERIOGRAM, AORTIC ROOT;  Surgeon: Miguel Ross III, MD;  Location: [facility] CATH;  Service: Cardiology;  Laterality: N/A;    BACK SURGERY      lumbar laminec    CARDIAC SURGERY  2007    x 4 vessels    CAROTID ENDARTERECTOMY Bilateral     each side done twice    CORONARY ANGIOPLASTY WITH STENT PLACEMENT      CORONARY ARTERY BYPASS GRAFT      CORONARY BYPASS GRAFT ANGIOGRAPHY  04/08/2022    Procedure: Bypass graft study;  Surgeon: Miguel Ross III, MD;  Location: [facility] CATH;  Service: Cardiology;;    EPIDURAL STEROID INJECTION INTO CERVICAL SPINE N/A 07/01/2022    Procedure: Injection-steroid-epidural-cervical;  Surgeon: Delmer Genao MD;  Location: [facility] OR;  Service: Pain Management;  Laterality: N/A;    EYE SURGERY Bilateral     cataracts    HYSTERECTOMY      INSERTION OF STENT INTO PERIPHERAL VESSEL  5/16/2024    Procedure: Lt. Subclavian stent placement;  Surgeon: Blayne Anthony MD;  Location: [facility] CATH;  Service: Cardiology;;    LEFT HEART CATHETERIZATION Left 04/08/2022    Procedure: CATHETERIZATION, HEART, LEFT;  Surgeon: Miguel Ross III, MD;  Location: [facility] CATH;  Service: Cardiology;  Laterality: Left;    PERCUTANEOUS TRANSLUMINAL ANGIOPLASTY  5/16/2024    Procedure: PTA Lt. Subclavian artery;  Surgeon: Blayne Anthony MD;  Location: [facility] CATH;  Service: Cardiology;;    PERIPHERAL ANGIOGRAPHY N/A 03/09/2021    Procedure: ANGIOGRAM, PERIPHERAL;  Surgeon: Miguel Goldsmith MD;  Location: [facility] CATH;  Service: Cardiology;  Laterality: N/A;    PERIPHERAL ANGIOGRAPHY  5/16/2024    Procedure: Lt. Subclavian angiogram;  Surgeon: Blayne Anthony MD;  Location: [facility] CATH;  Service: Cardiology;;    REVISION AMPUTATION OF FINGER Left     tip of left small finger  traumatic amputation.      TRANSFORAMINAL EPIDURAL INJECTION OF STEROID Left 12/27/2019    Procedure: Injection,steroid,epidural,transforaminal approach L3/4 and L4/5;  Surgeon: Delmer Genao MD;  Location: [facility] OR;  Service: Pain Management;  Laterality: Left;    TRANSFORAMINAL EPIDURAL INJECTION OF STEROID Right 01/28/2020    Procedure: Injection,steroid,epidural,transforaminal approach L3/4 and L4/5;  Surgeon: Delmer Genao MD;  Location: [facility] OR;  Service: Pain Management;  Laterality: Right;    TRANSFORAMINAL EPIDURAL INJECTION OF STEROID Bilateral 06/22/2021    Procedure: Injection,steroid,epidural,transforaminal approach L4/5;  Surgeon: Delmer Genao MD;  Location: [facility] OR;  Service: Pain Management;  Laterality: Bilateral;    TRANSFORAMINAL EPIDURAL INJECTION OF STEROID Bilateral 06/06/2022    Procedure: Injection,steroid,epidural,transforaminal approach L4/5;  Surgeon: Delmer Genao MD;  Location: [facility] OR;  Service: Pain Management;  Laterality: Bilateral;    TRANSFORAMINAL EPIDURAL INJECTION OF STEROID Bilateral 05/05/2023    Procedure: Injection,steroid,epidural,transforaminal approach L4/5;  Surgeon: Delmer Genao MD;  Location: [facility] OR;  Service: Pain Management;  Laterality: Bilateral;    TRANSFORAMINAL EPIDURAL INJECTION OF STEROID Bilateral 07/06/2023    Procedure: Injection,steroid,epidural,transforaminal approach L4/5;  Surgeon: Delmer Genao MD;  Location: [facility] OR;  Service: Pain Management;  Laterality: Bilateral;    TRIGGER FINGER RELEASE Left 06/21/2018    Procedure: RELEASE-FINGER-TRIGGER-LITTLE RING;  Surgeon: Norbert Ruelas MD;  Location: [facility] OR;  Service: Orthopedics;  Laterality: Left;    VASCULAR SURGERY         Social History     Socioeconomic History    Marital status:    Tobacco Use    Smoking status: Former     Current packs/day: 0.00     Average packs/day: 1.5 packs/day for 40.0 years (60.0 ttl pk-yrs)     Types: Cigarettes     Start date:  1967     Quit date: 2007     Years since quittin.8    Smokeless tobacco: Never   Substance and Sexual Activity    Alcohol use: Not Currently     Alcohol/week: 2.0 standard drinks of alcohol     Types: 2 Glasses of wine per week     Comment: socially, very rarely    Drug use: No         Medications/Allergies: See med card    Vitals:    25 1028   BP: (!) 146/64   Pulse: 63   Weight: 71 kg (156 lb 8.4 oz)   PainSc:   5   PainLoc: Back     Body mass index is 28.63 kg/m².        Physical exam:  Gen: A and O x3, pleasant, well-groomed  Musculoskeletal: Able to heel walk, toe walk. No antalgic gait.     Neuro:  Motor:      Iliopsoas Quadriceps Knee  Flexion Tibialis  anterior Gastro- cnemius EHL   Lower: R 5/5 5/5 5/5 5/5 5/5 5/5    L 5/5 5/5 5/5 5/5 5/5 5/5        Left  Right    Triceps DTR     Biceps DTR     Brachioradialis DTR     Patellar DTR 2+ 2+   Achilles DTR 2+ 2+   Roberts Absent  Absent   Clonus Absent Absent   Babinski Absent Absent     Sensory:  Intact and symmetrical to light touch and pinprick in L1-S1 dermatomes bilaterally.    Lumbar spine:  Lumbar spine: ROM is full with flexion extension and oblique extension with increased pain especially on extension.    Adair's test causes no increased pain on either side.    Supine straight leg raise is negative bilaterally.    Internal and external rotation of the hip causes no increased pain on either side.  Myofascial exam: No tenderness to palpation across lumbar paraspinous muscles.      Imagin19 MRI L-spine:  T12-L1: There is right facet joint arthropathy with ligamentum flavum thickening contributing to mild right foraminal stenosis.  There is no spinal stenosis.  L1-2: There is bilateral, left greater than right, facet joint arthropathy with ligamentum flavum thickening and a minimal disc bulge.  There is mild left foraminal stenosis without spinal stenosis.  L2-3: There is marked disc space narrowing.  There is a disc bulge  with osteophytic ridging and superimposed broad left paracentral disc protrusion with annular fissure.  There is facet joint arthropathy.  There is no spinal stenosis.  There is mild bilateral foraminal stenosis.  L3-4: There is moderate disc space narrowing.  There is a disc bulge with osteophytic ridging but there's a superimposed left paracentral and foraminal disc extrusion in addition to moderate facet joint arthropathy.  There is no significant spinal stenosis but there is severe left lateral recess and foraminal stenosis with mild-to-moderate right foraminal stenosis.  L4-5: There is marked disc space narrowing.  There's been left laminectomy.  There's right greater than left facet joint arthropathy.  There is a diffuse disc bulge with osteophytic ridging and superimposed large left paracentral disc extrusion contributing to moderate spinal stenosis, severe left and moderate right lateral recess stenosis as well as moderate to severe left and moderate right foraminal stenosis.  L5-S1: There is right greater than left facet joint arthropathy.  There's a mild disc bulge.  There is no significant spinal stenosis.  There is moderate right and mild left foraminal stenosis.    12/11/19 Xray L-spine:  There is a minimal rotary levocurvature of the lower lumbar spine.  There is multilevel degenerative disc and facet disease.  There is no fracture or malalignment and there is no abnormal excursion with flexion or extension.  There's marked disc space narrowing at the L2-3 through L4-5 levels.  There is multilevel facet joint arthropathy, most severe at the L5-S1 level.  There is moderate atherosclerosis.    4/19/22 Xray bilateral hips:  Moderate bilateral femoroacetabular osteoarthritis.    Assessment:   The patient is a 79-year-old woman with a history of hypertension, CAD on Plavix, history of lumbar surgery who presents in referral from [facility] for back pain and left leg pain.     1. Lumbar  spondylosis        2. Spinal stenosis of lumbar region without neurogenic claudication        3. Degeneration of intervertebral disc of lumbar region with discogenic back pain                Plan:  Assessment & Plan    LUMBAR SPINAL STENOSIS AND FACET JOINT PAIN:  1. Recommend medial branch blocks for facet joint pain, bilateral L4/5 and L5/S1  4. If successful, r would repeat 1 more time and then diofrequency ablation considered for longer-term relief.  5. Ms. MariaMarguerite to perform normal activities after procedure to assess pain relief.  6. Perform normal activities that typically cause pain (e.g., cooking, cleaning, vacuuming) after the diagnostic block to assess effectiveness.  We will call her the day after a procedure to determine effectiveness.

## 2025-04-21 NOTE — TELEPHONE ENCOUNTER
Physician - Dr Genao    Type of Procedure/Injection - Lumbar Medial Branch Block  L4/5 and L5/S1           Laterality - Bilateral      Priority - Normal      Anxiolysis- RNIV      Need to hold medication - Yes      N/A    Semaglutide (Ozempic) day of procedure, fast overnight      Clearance needed - No      Follow up - phone call next day

## 2025-05-05 ENCOUNTER — HOSPITAL ENCOUNTER (OUTPATIENT)
Facility: HOSPITAL | Age: 80
Discharge: HOME | End: 2025-05-05
Attending: ANESTHESIOLOGY | Admitting: ANESTHESIOLOGY
Payer: MEDICARE

## 2025-05-05 ENCOUNTER — HOSPITAL ENCOUNTER (OUTPATIENT)
Dept: RADIOLOGY | Facility: HOSPITAL | Age: 80
Discharge: HOME | End: 2025-05-05
Attending: ANESTHESIOLOGY | Admitting: ANESTHESIOLOGY
Payer: MEDICARE

## 2025-05-05 VITALS
TEMPERATURE: 97 F | OXYGEN SATURATION: 97 % | HEIGHT: 62 IN | HEART RATE: 70 BPM | WEIGHT: 156 LBS | BODY MASS INDEX: 28.71 KG/M2 | RESPIRATION RATE: 18 BRPM | DIASTOLIC BLOOD PRESSURE: 60 MMHG | SYSTOLIC BLOOD PRESSURE: 142 MMHG

## 2025-05-05 DIAGNOSIS — M47.816 LUMBAR SPONDYLOSIS: ICD-10-CM

## 2025-05-05 DIAGNOSIS — M54.50 LOWER BACK PAIN: ICD-10-CM

## 2025-05-05 PROCEDURE — 64493 INJ PARAVERT F JNT L/S 1 LEV: CPT | Mod: 50,,, | Performed by: ANESTHESIOLOGY

## 2025-05-05 PROCEDURE — 64493 INJ PARAVERT F JNT L/S 1 LEV: CPT | Mod: 50,PO | Performed by: ANESTHESIOLOGY

## 2025-05-05 PROCEDURE — 25000003 PHARM REV CODE 250: Mod: PO | Performed by: ANESTHESIOLOGY

## 2025-05-05 PROCEDURE — 63600175 PHARM REV CODE 636 W HCPCS: Mod: PO | Performed by: ANESTHESIOLOGY

## 2025-05-05 PROCEDURE — 64494 INJ PARAVERT F JNT L/S 2 LEV: CPT | Mod: 50,,, | Performed by: ANESTHESIOLOGY

## 2025-05-05 PROCEDURE — 64494 INJ PARAVERT F JNT L/S 2 LEV: CPT | Mod: 50,PO | Performed by: ANESTHESIOLOGY

## 2025-05-05 RX ORDER — SODIUM CHLORIDE 9 MG/ML
INJECTION, SOLUTION INTRAVENOUS CONTINUOUS
Status: DISCONTINUED | OUTPATIENT
Start: 2025-05-05 | End: 2025-05-05 | Stop reason: HOSPADM

## 2025-05-05 RX ORDER — LIDOCAINE HYDROCHLORIDE 10 MG/ML
INJECTION, SOLUTION EPIDURAL; INFILTRATION; INTRACAUDAL; PERINEURAL
Status: DISCONTINUED | OUTPATIENT
Start: 2025-05-05 | End: 2025-05-05 | Stop reason: HOSPADM

## 2025-05-05 RX ORDER — SODIUM CHLORIDE, SODIUM LACTATE, POTASSIUM CHLORIDE, CALCIUM CHLORIDE 600; 310; 30; 20 MG/100ML; MG/100ML; MG/100ML; MG/100ML
INJECTION, SOLUTION INTRAVENOUS CONTINUOUS
Status: DISCONTINUED | OUTPATIENT
Start: 2025-05-05 | End: 2025-05-05 | Stop reason: HOSPADM

## 2025-05-05 RX ORDER — MIDAZOLAM HYDROCHLORIDE 1 MG/ML
INJECTION INTRAMUSCULAR; INTRAVENOUS
Status: DISCONTINUED | OUTPATIENT
Start: 2025-05-05 | End: 2025-05-05 | Stop reason: HOSPADM

## 2025-05-05 RX ORDER — BUPIVACAINE HYDROCHLORIDE 2.5 MG/ML
INJECTION, SOLUTION EPIDURAL; INFILTRATION; INTRACAUDAL; PERINEURAL
Status: DISCONTINUED | OUTPATIENT
Start: 2025-05-05 | End: 2025-05-05 | Stop reason: HOSPADM

## 2025-05-05 RX ADMIN — SODIUM CHLORIDE: 9 INJECTION, SOLUTION INTRAVENOUS at 01:05

## 2025-05-05 NOTE — OP NOTE
PROCEDURE DATE: 5/5/2025    PROCEDURE:  Diagnostic bilateral L4/5 and L5/S1 medial branch nerve block     DIAGNOSIS:  Lumbar spondylosis    Post Op diagnosis: Same    PHYSICIAN: Delmer Genao MD    MEDICATIONS INJECTED: 0.25% bupivicaine, 1ml at each level    LOCAL ANESTHETIC USED: Lidocaine 1%, 2ml at each level    SEDATION MEDICATIONS:2mg versed    ESTIMATED BLOOD LOSS:  none    COMPLICATIONS:  none    TECHNIQUE: A time out was taken to identify the patient, procedure and side of the procedure. The patient was placed in a prone position, then prepped and draped in the usual sterile fashion using ChloraPrep and sterile towels.  The levels were determined under fluoroscopic guidance and then marked.  Local anesthetic was given by raising a wheal at the skin over each site and then infiltrated approximately 2cm deeper.  A 25-gauge 3.5 inch needle was introduced to the anatomic location of the right and then left L4/5 and L5/S1 medial branch nerves on the bilateral side.  The above medication was then injected. The patient tolerated the procedure well.     The patient was monitored after the procedure. The patient will be contacted in the next few days to determine extent of relief.  Patient was given post procedure and discharge instructions to follow at home.  The patient was discharged in a stable condition.

## 2025-05-05 NOTE — DISCHARGE SUMMARY
[facility] - Surgery  Discharge Note  Short Stay    Procedure(s) (LRB):  Block-nerve-medial branch-lumbar    L4/5 and L5/S1 (Bilateral)      OUTCOME: Patient tolerated treatment/procedure well without complication and is now ready for discharge.    DISPOSITION: Home or Self Care    FINAL DIAGNOSIS:  Lumbar spondylosis    FOLLOWUP: In clinic    DISCHARGE INSTRUCTIONS:    Discharge Procedure Orders   Diet Adult Regular     No dressing needed     Notify your health care provider if you experience any of the following:  temperature >100.4     Activity as tolerated

## 2025-05-05 NOTE — DISCHARGE INSTRUCTIONS
PAIN MANAGEMENT    HOME CARE INSTRUCTIONS   Do not use heat (such as a heating pad) for 24 hours.  You may apply an ice pack to the injection site for 20 minutes at a time for the first 24 hours for soreness/discomfort at injection site   Keep site clean and dry for 24 hours. If bandaid is present, remove when desired.              Do not soak for 48 hrs.   Do not drive until tomorrow.  Take care when walking after a lumbar injection.   Resume home medication as prescribed today.  Resume Aspirin, Plavix, or Coumadin the day after the procedure unless other wise instructed.    BLOCKS  Resume regular activities today.  Pain office will call in next 2 days.      CALL PHYSICIAN FOR:  Severe increase in your usual pain or the appearance of new pain.  Prolonged or increasing weakness or numbness in the legs or arms.  Fever greater than 100 degrees F.  Drainage, redness, active bleeding, or increased swelling at the injection site.  Headache that increases when your head is upright and decreases when you lie flat.    FOR EMERGENCIES:   Go directly to the emergency department for any shortness of breath, chest pain, or problems breathing.

## 2025-05-06 ENCOUNTER — TELEPHONE (OUTPATIENT)
Dept: PAIN MEDICINE | Facility: CLINIC | Age: 80
End: 2025-05-06
Payer: MEDICARE

## 2025-05-06 NOTE — TELEPHONE ENCOUNTER
Regarding your Lumbar Medial Branch Block , For Date of Service:    5/5/25  Please answer the following questions:    1. What percentage of pain relief did you receive following the block, from 0-100%?     2. How many hours did pain relief last following the block?      3. During this time please describe in detail the activities you were able to do?    4. Pain score from 0-10 pre procedure -      5. Pain score from 0-10 during block  -     6. Return to baseline pain score of 0-10 once the block wore off -     LVM forwarded questions to pt

## 2025-05-15 ENCOUNTER — TELEPHONE (OUTPATIENT)
Dept: PAIN MEDICINE | Facility: CLINIC | Age: 80
End: 2025-05-15
Payer: MEDICARE

## 2025-05-15 DIAGNOSIS — E08.65 DIABETES MELLITUS DUE TO UNDERLYING CONDITION WITH HYPERGLYCEMIA, WITH LONG-TERM CURRENT USE OF INSULIN: ICD-10-CM

## 2025-05-15 DIAGNOSIS — M47.816 LUMBAR SPONDYLOSIS: Primary | ICD-10-CM

## 2025-05-15 DIAGNOSIS — Z79.4 DIABETES MELLITUS DUE TO UNDERLYING CONDITION WITH HYPERGLYCEMIA, WITH LONG-TERM CURRENT USE OF INSULIN: ICD-10-CM

## 2025-05-15 RX ORDER — SODIUM CHLORIDE, SODIUM LACTATE, POTASSIUM CHLORIDE, CALCIUM CHLORIDE 600; 310; 30; 20 MG/100ML; MG/100ML; MG/100ML; MG/100ML
INJECTION, SOLUTION INTRAVENOUS CONTINUOUS
OUTPATIENT
Start: 2025-05-15

## 2025-05-15 NOTE — TELEPHONE ENCOUNTER
Regarding your Lumbar Medial Branch Block , For Date of Service: 5/5/25    Please answer the following questions:    1. What percentage of pain relief did you receive following the block, from 0-100%? 90%    2. How many hours did pain relief last following the block? 8 hrs     3. During this time please describe in detail the activities you were able to do? Road the lawnmower, able to walk, and ride in the car     4. Pain score from 0-10 pre procedure - 10     5. Pain score from 0-10 during block  - 1    6. Return to baseline pain score of 0-10 once the block wore off - 6

## 2025-05-15 NOTE — TELEPHONE ENCOUNTER
----- Message from Ray sent at 5/15/2025 11:08 AM CDT -----  Type:  Patient Returning CallWho Called:ptWho Left Message for Patient:Does the patient know what this is regarding?:next procedureWould the patient rather a call back or a response via [facility]? callBe Call Back Number:  [phone] Information: pt states she would like a callback from office to discuss what her next procedure will be. Thank you

## 2025-05-29 ENCOUNTER — HOSPITAL ENCOUNTER (OUTPATIENT)
Facility: HOSPITAL | Age: 80
Discharge: HOME | End: 2025-05-29
Attending: ANESTHESIOLOGY | Admitting: ANESTHESIOLOGY
Payer: MEDICARE

## 2025-05-29 ENCOUNTER — HOSPITAL ENCOUNTER (OUTPATIENT)
Dept: RADIOLOGY | Facility: HOSPITAL | Age: 80
Discharge: HOME | End: 2025-05-29
Attending: ANESTHESIOLOGY | Admitting: ANESTHESIOLOGY
Payer: MEDICARE

## 2025-05-29 VITALS
HEART RATE: 74 BPM | WEIGHT: 156 LBS | RESPIRATION RATE: 16 BRPM | TEMPERATURE: 98 F | BODY MASS INDEX: 28.71 KG/M2 | OXYGEN SATURATION: 98 % | DIASTOLIC BLOOD PRESSURE: 72 MMHG | SYSTOLIC BLOOD PRESSURE: 152 MMHG | HEIGHT: 62 IN

## 2025-05-29 DIAGNOSIS — M47.816 LUMBAR SPONDYLOSIS: ICD-10-CM

## 2025-05-29 DIAGNOSIS — M54.50 LOWER BACK PAIN: ICD-10-CM

## 2025-05-29 PROCEDURE — 64494 INJ PARAVERT F JNT L/S 2 LEV: CPT | Mod: 50,PO | Performed by: ANESTHESIOLOGY

## 2025-05-29 PROCEDURE — 64493 INJ PARAVERT F JNT L/S 1 LEV: CPT | Mod: 50,,, | Performed by: ANESTHESIOLOGY

## 2025-05-29 PROCEDURE — 63600175 PHARM REV CODE 636 W HCPCS: Mod: PO | Performed by: ANESTHESIOLOGY

## 2025-05-29 PROCEDURE — 64493 INJ PARAVERT F JNT L/S 1 LEV: CPT | Mod: 50,PO | Performed by: ANESTHESIOLOGY

## 2025-05-29 PROCEDURE — 64494 INJ PARAVERT F JNT L/S 2 LEV: CPT | Mod: 50,,, | Performed by: ANESTHESIOLOGY

## 2025-05-29 RX ORDER — LIDOCAINE HYDROCHLORIDE 10 MG/ML
INJECTION, SOLUTION EPIDURAL; INFILTRATION; INTRACAUDAL; PERINEURAL
Status: DISCONTINUED | OUTPATIENT
Start: 2025-05-29 | End: 2025-05-29 | Stop reason: HOSPADM

## 2025-05-29 RX ORDER — BUPIVACAINE HYDROCHLORIDE 2.5 MG/ML
INJECTION, SOLUTION EPIDURAL; INFILTRATION; INTRACAUDAL; PERINEURAL
Status: DISCONTINUED | OUTPATIENT
Start: 2025-05-29 | End: 2025-05-29 | Stop reason: HOSPADM

## 2025-05-29 RX ORDER — MIDAZOLAM HYDROCHLORIDE 1 MG/ML
INJECTION INTRAMUSCULAR; INTRAVENOUS
Status: DISCONTINUED | OUTPATIENT
Start: 2025-05-29 | End: 2025-05-29 | Stop reason: HOSPADM

## 2025-05-29 RX ORDER — SODIUM CHLORIDE, SODIUM LACTATE, POTASSIUM CHLORIDE, CALCIUM CHLORIDE 600; 310; 30; 20 MG/100ML; MG/100ML; MG/100ML; MG/100ML
INJECTION, SOLUTION INTRAVENOUS CONTINUOUS
Status: DISCONTINUED | OUTPATIENT
Start: 2025-05-29 | End: 2025-05-29 | Stop reason: HOSPADM

## 2025-05-29 RX ADMIN — SODIUM CHLORIDE, SODIUM LACTATE, POTASSIUM CHLORIDE, AND CALCIUM CHLORIDE: .6; .31; .03; .02 INJECTION, SOLUTION INTRAVENOUS at 02:05

## 2025-05-30 ENCOUNTER — TELEPHONE (OUTPATIENT)
Dept: PAIN MEDICINE | Facility: CLINIC | Age: 80
End: 2025-05-30
Payer: MEDICARE

## 2025-05-30 DIAGNOSIS — M47.816 LUMBAR SPONDYLOSIS: Primary | ICD-10-CM

## 2025-05-30 RX ORDER — SODIUM CHLORIDE, SODIUM LACTATE, POTASSIUM CHLORIDE, CALCIUM CHLORIDE 600; 310; 30; 20 MG/100ML; MG/100ML; MG/100ML; MG/100ML
INJECTION, SOLUTION INTRAVENOUS CONTINUOUS
OUTPATIENT
Start: 2025-05-30

## 2025-05-30 NOTE — TELEPHONE ENCOUNTER
Regarding your Lumbar Medial Branch Block , For Date of Service:  05-29-25    Please answer the following questions:    1. What percentage of pain relief did you receive following the block, from 0-100%?  100%    2. How many hours did pain relief last following the block?  24 hrs+    3. During this time please describe in detail the activities you were able to do? Patient states that she has been able to drive/ride around in car, cleaning up around the house and laundry.     4. Pain score from 0-10 pre procedure - 9     5. Pain score from 0-10 during block  - 0    6. Return to baseline pain score of 0-10 once the block wore off -  0

## 2025-07-10 ENCOUNTER — HOSPITAL ENCOUNTER (OUTPATIENT)
Facility: HOSPITAL | Age: 80
Discharge: HOME | End: 2025-07-10
Attending: ANESTHESIOLOGY | Admitting: ANESTHESIOLOGY
Payer: MEDICARE

## 2025-07-10 ENCOUNTER — HOSPITAL ENCOUNTER (OUTPATIENT)
Dept: RADIOLOGY | Facility: HOSPITAL | Age: 80
Discharge: HOME | End: 2025-07-10
Attending: ANESTHESIOLOGY | Admitting: ANESTHESIOLOGY
Payer: MEDICARE

## 2025-07-10 VITALS
HEART RATE: 65 BPM | OXYGEN SATURATION: 94 % | BODY MASS INDEX: 28.71 KG/M2 | DIASTOLIC BLOOD PRESSURE: 52 MMHG | SYSTOLIC BLOOD PRESSURE: 102 MMHG | WEIGHT: 156 LBS | HEIGHT: 62 IN | RESPIRATION RATE: 16 BRPM | TEMPERATURE: 98 F

## 2025-07-10 DIAGNOSIS — M54.50 LOWER BACK PAIN: ICD-10-CM

## 2025-07-10 DIAGNOSIS — M47.816 LUMBAR SPONDYLOSIS: ICD-10-CM

## 2025-07-10 PROCEDURE — 64636 DESTROY L/S FACET JNT ADDL: CPT | Mod: 50,,, | Performed by: ANESTHESIOLOGY

## 2025-07-10 PROCEDURE — 64636 DESTROY L/S FACET JNT ADDL: CPT | Mod: 50,PO | Performed by: ANESTHESIOLOGY

## 2025-07-10 PROCEDURE — 64635 DESTROY LUMB/SAC FACET JNT: CPT | Mod: 50,,, | Performed by: ANESTHESIOLOGY

## 2025-07-10 PROCEDURE — 64635 DESTROY LUMB/SAC FACET JNT: CPT | Mod: 50,PO | Performed by: ANESTHESIOLOGY

## 2025-07-10 PROCEDURE — 63600175 PHARM REV CODE 636 W HCPCS: Mod: PO | Performed by: ANESTHESIOLOGY

## 2025-07-10 RX ORDER — METHYLPREDNISOLONE ACETATE 40 MG/ML
INJECTION, SUSPENSION INTRA-ARTICULAR; INTRALESIONAL; INTRAMUSCULAR; SOFT TISSUE
Status: DISCONTINUED | OUTPATIENT
Start: 2025-07-10 | End: 2025-07-10 | Stop reason: HOSPADM

## 2025-07-10 RX ORDER — SODIUM CHLORIDE, SODIUM LACTATE, POTASSIUM CHLORIDE, CALCIUM CHLORIDE 600; 310; 30; 20 MG/100ML; MG/100ML; MG/100ML; MG/100ML
INJECTION, SOLUTION INTRAVENOUS CONTINUOUS
Status: DISCONTINUED | OUTPATIENT
Start: 2025-07-10 | End: 2025-07-10

## 2025-07-10 RX ORDER — MIDAZOLAM HYDROCHLORIDE 1 MG/ML
INJECTION INTRAMUSCULAR; INTRAVENOUS
Status: DISCONTINUED | OUTPATIENT
Start: 2025-07-10 | End: 2025-07-10 | Stop reason: HOSPADM

## 2025-07-10 RX ORDER — LIDOCAINE HYDROCHLORIDE 10 MG/ML
INJECTION, SOLUTION EPIDURAL; INFILTRATION; INTRACAUDAL; PERINEURAL
Status: DISCONTINUED | OUTPATIENT
Start: 2025-07-10 | End: 2025-07-10 | Stop reason: HOSPADM

## 2025-07-10 RX ORDER — SODIUM CHLORIDE, SODIUM LACTATE, POTASSIUM CHLORIDE, CALCIUM CHLORIDE 600; 310; 30; 20 MG/100ML; MG/100ML; MG/100ML; MG/100ML
INJECTION, SOLUTION INTRAVENOUS CONTINUOUS
Status: DISCONTINUED | OUTPATIENT
Start: 2025-07-10 | End: 2025-07-10 | Stop reason: HOSPADM

## 2025-07-10 RX ORDER — SODIUM CHLORIDE 9 MG/ML
INJECTION, SOLUTION INTRAVENOUS CONTINUOUS
Status: DISCONTINUED | OUTPATIENT
Start: 2025-07-10 | End: 2025-07-10

## 2025-07-10 RX ORDER — FENTANYL CITRATE 50 UG/ML
INJECTION, SOLUTION INTRAMUSCULAR; INTRAVENOUS
Status: DISCONTINUED | OUTPATIENT
Start: 2025-07-10 | End: 2025-07-10 | Stop reason: HOSPADM

## 2025-07-10 RX ORDER — LIDOCAINE HYDROCHLORIDE 20 MG/ML
INJECTION, SOLUTION EPIDURAL; INFILTRATION; INTRACAUDAL; PERINEURAL
Status: DISCONTINUED | OUTPATIENT
Start: 2025-07-10 | End: 2025-07-10 | Stop reason: HOSPADM

## 2025-07-10 RX ADMIN — SODIUM CHLORIDE, POTASSIUM CHLORIDE, SODIUM LACTATE AND CALCIUM CHLORIDE: 600; 310; 30; 20 INJECTION, SOLUTION INTRAVENOUS at 12:07

## 2025-07-10 NOTE — DISCHARGE SUMMARY
[facility] - [facility]  Discharge Note  Short Stay    Procedure(s) (LRB):  Radiofrequency Ablation, Nerve, Spinal, Lumbar, Medial Branch, L4/5 and L5/S1 (Bilateral)      OUTCOME: Patient tolerated treatment/procedure well without complication and is now ready for discharge.    DISPOSITION: Home or Self Care    FINAL DIAGNOSIS:  Lumbar spondylosis    FOLLOWUP: In clinic    DISCHARGE INSTRUCTIONS:    Discharge Procedure Orders   Diet Adult Regular     No dressing needed     Notify your health care provider if you experience any of the following:  temperature >100.4     Activity as tolerated

## 2025-07-10 NOTE — H&P
CC: Back pain    HPI: The patient is a 78yo woman with a history of lumbar spondylosis here for bilateral L4/5 and L5/S1. There are no major changes in history and physical from 4/21/25.    Past Medical History:   Diagnosis Date    Anticoagulant long-term use     CAD (coronary artery disease)     cabg x 4 2007; stents    Carotid artery stenosis     DDD (degenerative disc disease), lumbar     Diabetes mellitus     Encounter for blood transfusion     GERD (gastroesophageal reflux disease)     Hypertension     AUDREY on CPAP     No CPAP    PVD (peripheral vascular disease)     Thyroid disease        Past Surgical History:   Procedure Laterality Date    ARTERIOGRAPHY OF AORTIC ROOT N/A 04/08/2022    Procedure: ARTERIOGRAM, AORTIC ROOT;  Surgeon: Miguel Ross III, MD;  [facility];  Service: Cardiology;  Laterality: N/A;    BACK SURGERY      lumbar laminec    CARDIAC SURGERY  2007    x 4 vessels    CAROTID ENDARTERECTOMY Bilateral     each side done twice    CORONARY ANGIOPLASTY WITH STENT PLACEMENT      CORONARY ARTERY BYPASS GRAFT      CORONARY BYPASS GRAFT ANGIOGRAPHY  04/08/2022    Procedure: Bypass graft study;  Surgeon: Miguel Ross III, MD;  Location: [facility];  Service: Cardiology;;    EPIDURAL STEROID INJECTION INTO CERVICAL SPINE N/A 07/01/2022    Procedure: Injection-steroid-epidural-cervical;  Surgeon: Delmer Genao MD;  Location: [facility] OR;  Service: Pain Management;  Laterality: N/A;    EYE SURGERY Bilateral     cataracts    HYSTERECTOMY      INJECTION OF ANESTHETIC AGENT AROUND MEDIAL BRANCH NERVES INNERVATING LUMBAR FACET JOINT Bilateral 5/5/2025    Procedure: Block-nerve-medial branch-lumbar    L4/5 and L5/S1;  Surgeon: Delmer Genao MD;  Location: [facility] OR;  Service: Pain Management;  Laterality: Bilateral;  normal    INJECTION OF ANESTHETIC AGENT AROUND MEDIAL BRANCH NERVES INNERVATING LUMBAR FACET JOINT Bilateral 5/29/2025    Procedure: Block-nerve-medial branch-lumbar L4/5, L5/S1;   Surgeon: Delmer Genao MD;  Location: [facility] OR;  Service: Pain Management;  Laterality: Bilateral;    INSERTION OF STENT INTO PERIPHERAL VESSEL  5/16/2024    Procedure: Lt. Subclavian stent placement;  Surgeon: Blayne Anthony MD;  Location: [facility];  Service: Cardiology;;    LEFT HEART CATHETERIZATION Left 04/08/2022    Procedure: CATHETERIZATION, HEART, LEFT;  Surgeon: Miguel Ross III, MD;  Location: [facility];  Service: Cardiology;  Laterality: Left;    PERCUTANEOUS TRANSLUMINAL ANGIOPLASTY  5/16/2024    Procedure: PTA Lt. Subclavian artery;  Surgeon: Blayne Anthony MD;  Location: [facility];  Service: Cardiology;;    PERIPHERAL ANGIOGRAPHY N/A 03/09/2021    Procedure: ANGIOGRAM, PERIPHERAL;  Surgeon: Miguel Goldsmith MD;  Location: [facility];  Service: Cardiology;  Laterality: N/A;    PERIPHERAL ANGIOGRAPHY  5/16/2024    Procedure: Lt. Subclavian angiogram;  Surgeon: Blayne Anthony MD;  Location: [facility];  Service: Cardiology;;    REVISION AMPUTATION OF FINGER Left     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      TRANSFORAMINAL EPIDURAL INJECTION OF STEROID Left 12/27/2019    Procedure: Injection,steroid,epidural,transforaminal approach L3/4 and L4/5;  Surgeon: Delmer Genao MD;  Location: [facility] OR;  Service: Pain Management;  Laterality: Left;    TRANSFORAMINAL EPIDURAL INJECTION OF STEROID Right 01/28/2020    Procedure: Injection,steroid,epidural,transforaminal approach L3/4 and L4/5;  Surgeon: Delmer Genao MD;  Location: [facility] OR;  Service: Pain Management;  Laterality: Right;    TRANSFORAMINAL EPIDURAL INJECTION OF STEROID Bilateral 06/22/2021    Procedure: Injection,steroid,epidural,transforaminal approach L4/5;  Surgeon: Delmer Genao MD;  Location: [facility] OR;  Service: Pain Management;  Laterality: Bilateral;    TRANSFORAMINAL EPIDURAL INJECTION OF STEROID Bilateral 06/06/2022    Procedure: Injection,steroid,epidural,transforaminal approach L4/5;  Surgeon: Delmer WALTERS  "MD Chadwick;  Location: [facility] OR;  Service: Pain Management;  Laterality: Bilateral;    TRANSFORAMINAL EPIDURAL INJECTION OF STEROID Bilateral 2023    Procedure: Injection,steroid,epidural,transforaminal approach L4/5;  Surgeon: Delmer Genao MD;  Location: [facility] OR;  Service: Pain Management;  Laterality: Bilateral;    TRANSFORAMINAL EPIDURAL INJECTION OF STEROID Bilateral 2023    Procedure: Injection,steroid,epidural,transforaminal approach L4/5;  Surgeon: Delmer Genao MD;  Location: [facility] OR;  Service: Pain Management;  Laterality: Bilateral;    TRIGGER FINGER RELEASE Left 2018    Procedure: RELEASE-FINGER-TRIGGER-LITTLE RING;  Surgeon: Norbert Ruelas MD;  Location: [facility] OR;  Service: Orthopedics;  Laterality: Left;    VASCULAR SURGERY         Family History   Problem Relation Name Age of Onset    Hypertension Mother      Arthritis Mother      Cancer Father          esophageal    No Known Problems Sister      Heart disease Brother      Cancer Brother      Heart disease Brother      Cancer Brother         Social History     Socioeconomic History    Marital status:    Tobacco Use    Smoking status: Former     Current packs/day: 0.00     Average packs/day: 1.5 packs/day for 40.0 years (60.0 ttl pk-yrs)     Types: Cigarettes     Start date: 1967     Quit date: 2007     Years since quittin.0    Smokeless tobacco: Never   Substance and Sexual Activity    Alcohol use: Yes     Alcohol/week: 2.0 standard drinks of alcohol     Types: 2 Glasses of wine per week     Comment: socially, very rarely    Drug use: No       Current Medications[1]    Review of patient's allergies indicates:  No Known Allergies    Vitals:    25 1219 07/10/25 1242 07/10/25 1243 07/10/25 1245   BP:  (!) 86/40 (!) 96/54 (!) 118/56   Pulse:  63     Resp:  16     Temp:    97.8 °F (36.6 °C)   TempSrc:    Skin   SpO2:  96%     Weight: 70.8 kg (156 lb)      Height: 5' 2" (1.575 m)          ASA 2, " Mallampati 2    REVIEW OF SYSTEMS:     GENERAL: No weight loss, malaise or fevers.  HEENT:  No recent changes in vision or hearing  NECK: Negative for lumps, no difficulty with swallowing.  RESPIRATORY: Negative for cough, wheezing or shortness of breath, patient denies any recent URI.  CARDIOVASCULAR: Negative for chest pain, leg swelling or palpitations.  GI: Negative for abdominal discomfort, blood in stools or black stools or change in bowel habits.  MUSCULOSKELETAL: See HPI.  SKIN: Negative for lesions, rash, and itching.  PSYCH: No suicidal or homicidal ideations, no current mood disturbances.  HEMATOLOGY/LYMPHOLOGY: Negative for prolonged bleeding, bruising easily or swollen nodes. Patient is not currently taking any anti-coagulants  ENDO: No history of diabetes or thyroid dysfunction  NEURO: No history of syncope, paralysis, seizures or tremors.All other reviewed and negative other than HPI.    Physical exam:  Gen: A and O x3, pleasant, well-groomed  Skin: No rashes or obvious lesions  HEENT: PERRLA, no obvious deformities on ears or in canals. No thyroid masses, trachea midline, no palpable lymph nodes in neck, axilla.  CVS: Regular rate and rhythm, normal S1 and S2, no murmurs.  Resp: Clear to auscultation bilaterally.  Abdomen: Soft, NT/ND, normal bowel sounds present.  Musculoskeletal/Neuro: Moving all extremities    Assessment:  Lumbar spondylosis  -     Place in Outpatient; Standing  -     Vital signs; Standing  -     Place 18-22 [facility] IV ; Standing  -     Verify informed consent; Standing  -     Notify physician ; Standing  -     Notify physician ; Standing  -     Notify physician (specify); Standing  -     Notify physician (specify); Standing  -     Notify physician (specify); Standing  -     Diet NPO; Standing  -     Discontinue: lactated ringers infusion    Other orders  -     IP VTE LOW RISK PATIENT; Standing  -     Discontinue: 0.9% NaCl infusion  -     lactated ringers  infusion               [1]   Current Facility-Administered Medications   Medication Dose Route Frequency Provider Last Rate Last Admin    lactated ringers infusion   Intravenous Continuous Delmer Genao MD 25 mL/hr at 07/10/25 1253 New Bag at 07/10/25 1253     Facility-Administered Medications Ordered in Other Encounters   Medication Dose Route Frequency Provider Last Rate Last Admin    diphenhydrAMINE injection 25 mg  25 mg Intravenous On Call Procedure Miguel Ross III, MD        diphenhydrAMINE injection 50 mg  50 mg Intravenous Once Miguel Ross III, MD        LIDOcaine (PF) 10 mg/ml (1%) injection 10 mg  1 mL Other Once Miguel Ross III, MD        lorazepam injection 1 mg  1 mg Intravenous Once Miguel Ross III, MD        lorazepam injection 1 mg  1 mg Intravenous On Call Procedure Miguel Ross III, MD   1 mg at 04/08/22 0783

## 2025-07-10 NOTE — OP NOTE
PROCEDURE DATE: 7/10/2025    PROCEDURE:  Radiofrequency ablation of the bilateral L4/5 and L5/S1 medial branch nerves on the bilateral-side utilizing fluoroscopy    DIAGNOSIS:  Lumbar spondylosis    Post op Diagnosis: Same    PHYSICIAN: Delmer Genao MD    MEDICATIONS INJECTED:  From a mixture of 4ml of 2% lidocaine and 40mg of methylprednisone, 1ml of this solution was injected at each level.    LOCAL ANESTHETIC USED: Lidocaine 1%, 4 ml given at each site.    SEDATION MEDICATIONS: 2mg versed, 25mcg fentanyl    ESTIMATED BLOOD LOSS:  none    COMPLICATIONS:  none    TECHNIQUE:  A time out was taken to identify patient and procedure side prior to starting the procedure. Laying in a prone position, the patient was prepped and draped in the usual sterile fashion using ChloraPrep and sterile towels.  The levels were determined under fluoroscopic guidance and then marked.  Local anesthetic was given by raising a wheal at the skin over each site and then infiltrated approximately 2cm deeper.  A 20-gauge  100 mm  RF needle was introduced to the anatomic location of the right and then left L4/5 and L5/S1 medial branch nerves.  Motor stimulation up to 2 Volts at each level confirmed no motor nerve involvement.  Impedance was less than 800 ohms at each level. The above noted medication was then injected slowly.  Ablation was performed per level utilizing Optimitive radiofrequency generator 80°C for 90 seconds. The patient tolerated the procedure well.     The patient was monitored after the procedure.  Patient was given post procedure and discharge instructions to follow at home.  The patient was discharged in a stable condition

## (undated) DEVICE — MARKER SKIN STND TIP BLUE BARR

## (undated) DEVICE — SEE MEDLINE ITEM 152622

## (undated) DEVICE — GLOVE SURGICAL LATEX SZ 7

## (undated) DEVICE — NDL SPINAL SPINOCAN 22GX3.5

## (undated) DEVICE — TRAY NERVE BLOCK

## (undated) DEVICE — MARKER SKIN RULER STERILE

## (undated) DEVICE — CANNULA CVD 100MM X 20G

## (undated) DEVICE — APPLICATOR CHLORAPREP CLR 10.5

## (undated) DEVICE — PAD ELECTROSURGICAL PAT PLATE

## (undated) DEVICE — SYR GLASS 5CC LUER LOK

## (undated) DEVICE — NDL HYPO STD REG BVL 18GX1.5IN

## (undated) DEVICE — TOWEL OR DISP STRL BLUE 4/PK

## (undated) DEVICE — NDL TUOHY EPIDURAL 20G X 3.5

## (undated) DEVICE — GLOVE SENSICARE PI MICRO 7